# Patient Record
Sex: FEMALE | Race: WHITE | HISPANIC OR LATINO
[De-identification: names, ages, dates, MRNs, and addresses within clinical notes are randomized per-mention and may not be internally consistent; named-entity substitution may affect disease eponyms.]

---

## 2019-08-13 PROBLEM — Z00.00 ENCOUNTER FOR PREVENTIVE HEALTH EXAMINATION: Status: ACTIVE | Noted: 2019-08-13

## 2019-08-28 ENCOUNTER — APPOINTMENT (OUTPATIENT)
Dept: PULMONOLOGY | Facility: CLINIC | Age: 44
End: 2019-08-28
Payer: COMMERCIAL

## 2019-08-28 VITALS
BODY MASS INDEX: 51.91 KG/M2 | DIASTOLIC BLOOD PRESSURE: 78 MMHG | SYSTOLIC BLOOD PRESSURE: 122 MMHG | RESPIRATION RATE: 16 BRPM | WEIGHT: 293 LBS | HEIGHT: 63 IN | OXYGEN SATURATION: 98 % | HEART RATE: 71 BPM | TEMPERATURE: 98 F

## 2019-08-28 DIAGNOSIS — E66.01 MORBID (SEVERE) OBESITY DUE TO EXCESS CALORIES: ICD-10-CM

## 2019-08-28 DIAGNOSIS — Z87.442 PERSONAL HISTORY OF URINARY CALCULI: ICD-10-CM

## 2019-08-28 DIAGNOSIS — E78.00 PURE HYPERCHOLESTEROLEMIA, UNSPECIFIED: ICD-10-CM

## 2019-08-28 DIAGNOSIS — I10 ESSENTIAL (PRIMARY) HYPERTENSION: ICD-10-CM

## 2019-08-28 DIAGNOSIS — Z78.9 OTHER SPECIFIED HEALTH STATUS: ICD-10-CM

## 2019-08-28 DIAGNOSIS — R06.81 APNEA, NOT ELSEWHERE CLASSIFIED: ICD-10-CM

## 2019-08-28 DIAGNOSIS — R06.83 SNORING: ICD-10-CM

## 2019-08-28 PROCEDURE — 99204 OFFICE O/P NEW MOD 45 MIN: CPT | Mod: 25

## 2019-08-28 NOTE — PHYSICAL EXAM
[General Appearance - Well Developed] : well developed [Normal Appearance] : normal appearance [General Appearance - Well Nourished] : well nourished [Elongated Uvula] : elongated uvula [Low Lying Soft Palate] : low lying soft palate [Neck Appearance] : the appearance of the neck was normal [III] : III [Neck Circumference: ___] : neck circumference is [unfilled] [Apical Impulse] : the apical impulse was normal [Heart Rate And Rhythm] : heart rate was normal and rhythm regular [Heart Sounds] : normal S1 and S2 [Respiration, Rhythm And Depth] : normal respiratory rhythm and effort [] : no respiratory distress [Auscultation Breath Sounds / Voice Sounds] : lungs were clear to auscultation bilaterally [Musculoskeletal - Swelling] : no joint swelling seen [Nail Clubbing] : no clubbing of the fingernails [Cyanosis, Localized] : no localized cyanosis [Skin Color & Pigmentation] : normal skin color and pigmentation [Skin Lesions] : no skin lesions [Oriented To Time, Place, And Person] : oriented to person, place, and time [No Focal Deficits] : no focal deficits [Impaired Insight] : insight and judgment were intact [Affect] : the affect was normal [FreeTextEntry1] : Obese [FreeTextEntry2] : no edema

## 2019-08-28 NOTE — REASON FOR VISIT
[Consultation] : a consultation visit [Sleep Apnea] : sleep apnea [FreeTextEntry1] : Referred by Dr. Marshall- [facility]

## 2019-08-28 NOTE — ASSESSMENT
[Obesity, Class III, BMI 40-49.9 (E66.01)] : macrophage activation syndrome [FreeTextEntry1] : This is a 43 year old female referred by [facility] pulmonologists for evaluation of possible sleep apnea. The patient has multiple signs and symptoms of sleep-disordered breathing include frequent nocturnal awakenings, loud snoring, witnessed apneas, daytime sleepiness, and early morning dry mouth. She has other cofactors such as hypertension and obesity which can results in RAY or be a sequela. She was referred to the [facility] for a diagnostic PSG. The ramifications of RAY and its potential therapeutic modalities were discussed with the patient. The patient was cautioned on the importance of avoiding drowsy driving. She will follow up with us after the PSG.\par \par In addition there is concern for nocturnal hypoventilation given her weight. Therefore the in lab polysomnograph will include transcutaneous CO2 monitoring to evaluate for hypoventilation. I will also perform pulmonary function tests to evaluate her lungs.

## 2019-08-28 NOTE — CONSULT LETTER
[Consult Letter:] : I had the pleasure of evaluating your patient, [unfilled]. [Dear  ___] : Dear  [unfilled], [Consult Closing:] : Thank you very much for allowing me to participate in the care of this patient.  If you have any questions, please do not hesitate to contact me. [Please see my note below.] : Please see my note below. [Sincerely,] : Sincerely, [FreeTextEntry3] : Kamran Stroud DO, MPH\par Pulmonary, Critical Care, and Sleep Medicine\par Pulmonary Medicine [facility] at [facility]

## 2019-08-28 NOTE — REVIEW OF SYSTEMS
[EDS: ESS=____] : daytime somnolence: ESS=[unfilled] [Recent Wt Gain (___ Lbs)] : recent [unfilled] ~Ulb weight gain [Snoring] : snoring [Nasal Congestion] : nasal congestion [Witnessed Apneas] : witnessed apnea [A.M. Dry Mouth] : a.m. dry mouth [Obesity] : obesity [Difficulty Maintaining Sleep] : difficulty maintaining sleep [Negative] : Psychiatric [Leg Dysesthesias] : no leg dysesthesias [Difficulty Initiating Sleep] : no difficulty falling asleep [Acute Insomnia] : no acute insomnia [Chronic Insomnia] : no chronic  insomnia [Lower Extremity Discomfort] : no lower extremity discomfort [Irresistible urge to move legs] : no irresistible urge to move legs because of lower extremity discomfort [LE discomfort relieved by movement] : lower extremity discomfort not relieved by movement [Sleep Disturbances due to LE symptoms] : ~T no sleep disturbances due to lower extremity symptoms [Late day/ Evening symptoms] : no late day/evening symptoms [Unusual Sleep Behavior] : no unusual sleep behavior [Hypersomnolence] : not sleeping much more than usual [Cataplexy] :  no cataplexy [Hypnogogic Hallucinations] : no hypnogogic hallucinations [Hypnopompic Hallucinations] : no hypnopompic hallucinations [Sleep Paralysis] : no sleep paralysis

## 2019-08-28 NOTE — HISTORY OF PRESENT ILLNESS
[Snoring] : snoring [Witnessed Apneas] : witnessed sleep apnea [Frequent Nocturnal Awakening] : frequent nocturnal awakening [Daytime Somnolence] : daytime somnolence [ESS: ___] : ESS score [unfilled] [Unintentional Sleep While Inactive] : unintentional sleep while inactive [Awakes Unrefreshed] : awakening unrefreshed [Awakening With Dry Mouth] : awakening with dry mouth [Recent  Weight Gain] : recent weight gain [DMS] : DMS [FreeTextEntry1] : Ms. Castillo is a 43-year-old female with a significant past medical history of obesity and high blood pressure who comes in for evaluation sleep disordered breathing. Per the patient she was referred by the pulmonologists at [facility]. She was told that she has some desaturation at night when she was hospitalized recently for some chest pain. Currently she complains of frequent nocturnal awakenings which results in gasping for air. Her daughters, who are present, also state that they have witnessed this. She feels tired throughout most of the day. She does admit to some shortness of breath however it is not constant. She is originally from [place]. She knows she is overweight, and has been trying to lose weight by eating well. The rest of her symptoms include frequent nocturnal awakenings, loud snoring, witnessed apneas, daytime sleepiness, and early morning dry mouth.\par \par \par Of note she has been referred for sleep study twice in the past but has been too afraid to undergo the testing. [Unintentional Sleep while Active] : no unintentional sleep while active [Awakes with Headache] : no headache upon awakening [DIS] : no DIS [Hypersomnolence] : no hypersomnolence [Unusual Sleep Behavior] : no unusual sleep behavior [Cataplexy] : no cataplexy [Sleep Paralysis] : no sleep paralysis [Hypnagogic Hallucinations] : no hallucinations when falling asleep [Hypnopompic Hallucinations] : no hallucinations when awakening

## 2019-09-09 ENCOUNTER — APPOINTMENT (OUTPATIENT)
Dept: PULMONOLOGY | Facility: CLINIC | Age: 44
End: 2019-09-09

## 2020-03-28 ENCOUNTER — EMERGENCY (EMERGENCY)
Facility: HOSPITAL | Age: 45
LOS: 1 days | Discharge: HOME | End: 2020-03-28
Attending: EMERGENCY MEDICINE | Admitting: EMERGENCY MEDICINE
Payer: SELF-PAY

## 2020-03-28 VITALS
OXYGEN SATURATION: 100 % | HEIGHT: 61 IN | SYSTOLIC BLOOD PRESSURE: 138 MMHG | TEMPERATURE: 98 F | RESPIRATION RATE: 126 BRPM | WEIGHT: 281.09 LBS | DIASTOLIC BLOOD PRESSURE: 93 MMHG | HEART RATE: 77 BPM

## 2020-03-28 VITALS
OXYGEN SATURATION: 99 % | SYSTOLIC BLOOD PRESSURE: 112 MMHG | DIASTOLIC BLOOD PRESSURE: 85 MMHG | TEMPERATURE: 98 F | HEART RATE: 71 BPM | RESPIRATION RATE: 17 BRPM

## 2020-03-28 LAB
ALBUMIN SERPL ELPH-MCNC: 3.9 G/DL — SIGNIFICANT CHANGE UP (ref 3.3–5)
ALP SERPL-CCNC: 58 U/L — SIGNIFICANT CHANGE UP (ref 30–120)
ALT FLD-CCNC: 27 U/L DA — SIGNIFICANT CHANGE UP (ref 10–60)
ANION GAP SERPL CALC-SCNC: 7 MMOL/L — SIGNIFICANT CHANGE UP (ref 5–17)
APPEARANCE UR: CLEAR — SIGNIFICANT CHANGE UP
AST SERPL-CCNC: 30 U/L — SIGNIFICANT CHANGE UP (ref 10–40)
BASOPHILS # BLD AUTO: 0.02 K/UL — SIGNIFICANT CHANGE UP (ref 0–0.2)
BASOPHILS NFR BLD AUTO: 0.5 % — SIGNIFICANT CHANGE UP (ref 0–2)
BILIRUB SERPL-MCNC: 0.2 MG/DL — SIGNIFICANT CHANGE UP (ref 0.2–1.2)
BILIRUB UR-MCNC: NEGATIVE — SIGNIFICANT CHANGE UP
BUN SERPL-MCNC: 8 MG/DL — SIGNIFICANT CHANGE UP (ref 7–23)
CALCIUM SERPL-MCNC: 8.8 MG/DL — SIGNIFICANT CHANGE UP (ref 8.4–10.5)
CHLORIDE SERPL-SCNC: 105 MMOL/L — SIGNIFICANT CHANGE UP (ref 96–108)
CO2 SERPL-SCNC: 27 MMOL/L — SIGNIFICANT CHANGE UP (ref 22–31)
COLOR SPEC: SIGNIFICANT CHANGE UP
CREAT SERPL-MCNC: 0.67 MG/DL — SIGNIFICANT CHANGE UP (ref 0.5–1.3)
DIFF PNL FLD: NEGATIVE — SIGNIFICANT CHANGE UP
EOSINOPHIL # BLD AUTO: 0.01 K/UL — SIGNIFICANT CHANGE UP (ref 0–0.5)
EOSINOPHIL NFR BLD AUTO: 0.3 % — SIGNIFICANT CHANGE UP (ref 0–6)
GLUCOSE SERPL-MCNC: 100 MG/DL — HIGH (ref 70–99)
GLUCOSE UR QL: NEGATIVE MG/DL — SIGNIFICANT CHANGE UP
HCG UR QL: NEGATIVE — SIGNIFICANT CHANGE UP
HCT VFR BLD CALC: 45.1 % — HIGH (ref 34.5–45)
HGB BLD-MCNC: 14.5 G/DL — SIGNIFICANT CHANGE UP (ref 11.5–15.5)
IMM GRANULOCYTES NFR BLD AUTO: 0.5 % — SIGNIFICANT CHANGE UP (ref 0–1.5)
KETONES UR-MCNC: NEGATIVE — SIGNIFICANT CHANGE UP
LEUKOCYTE ESTERASE UR-ACNC: NEGATIVE — SIGNIFICANT CHANGE UP
LYMPHOCYTES # BLD AUTO: 1.45 K/UL — SIGNIFICANT CHANGE UP (ref 1–3.3)
LYMPHOCYTES # BLD AUTO: 36.3 % — SIGNIFICANT CHANGE UP (ref 13–44)
MCHC RBC-ENTMCNC: 28.3 PG — SIGNIFICANT CHANGE UP (ref 27–34)
MCHC RBC-ENTMCNC: 32.2 GM/DL — SIGNIFICANT CHANGE UP (ref 32–36)
MCV RBC AUTO: 87.9 FL — SIGNIFICANT CHANGE UP (ref 80–100)
MONOCYTES # BLD AUTO: 0.46 K/UL — SIGNIFICANT CHANGE UP (ref 0–0.9)
MONOCYTES NFR BLD AUTO: 11.5 % — SIGNIFICANT CHANGE UP (ref 2–14)
NEUTROPHILS # BLD AUTO: 2.03 K/UL — SIGNIFICANT CHANGE UP (ref 1.8–7.4)
NEUTROPHILS NFR BLD AUTO: 50.9 % — SIGNIFICANT CHANGE UP (ref 43–77)
NITRITE UR-MCNC: NEGATIVE — SIGNIFICANT CHANGE UP
NRBC # BLD: 0 /100 WBCS — SIGNIFICANT CHANGE UP (ref 0–0)
PH UR: 7 — SIGNIFICANT CHANGE UP (ref 5–8)
PLATELET # BLD AUTO: 220 K/UL — SIGNIFICANT CHANGE UP (ref 150–400)
POTASSIUM SERPL-MCNC: 4.6 MMOL/L — SIGNIFICANT CHANGE UP (ref 3.5–5.3)
POTASSIUM SERPL-SCNC: 4.6 MMOL/L — SIGNIFICANT CHANGE UP (ref 3.5–5.3)
PROT SERPL-MCNC: 8.2 G/DL — SIGNIFICANT CHANGE UP (ref 6–8.3)
PROT UR-MCNC: NEGATIVE MG/DL — SIGNIFICANT CHANGE UP
RBC # BLD: 5.13 M/UL — SIGNIFICANT CHANGE UP (ref 3.8–5.2)
RBC # FLD: 13.4 % — SIGNIFICANT CHANGE UP (ref 10.3–14.5)
SODIUM SERPL-SCNC: 139 MMOL/L — SIGNIFICANT CHANGE UP (ref 135–145)
SP GR SPEC: 1 — LOW (ref 1.01–1.02)
UROBILINOGEN FLD QL: NEGATIVE MG/DL — SIGNIFICANT CHANGE UP
WBC # BLD: 3.99 K/UL — SIGNIFICANT CHANGE UP (ref 3.8–10.5)
WBC # FLD AUTO: 3.99 K/UL — SIGNIFICANT CHANGE UP (ref 3.8–10.5)

## 2020-03-28 PROCEDURE — 74176 CT ABD & PELVIS W/O CONTRAST: CPT | Mod: 26

## 2020-03-28 PROCEDURE — 85027 COMPLETE CBC AUTOMATED: CPT

## 2020-03-28 PROCEDURE — 80053 COMPREHEN METABOLIC PANEL: CPT

## 2020-03-28 PROCEDURE — 36415 COLL VENOUS BLD VENIPUNCTURE: CPT

## 2020-03-28 PROCEDURE — 81025 URINE PREGNANCY TEST: CPT

## 2020-03-28 PROCEDURE — 87086 URINE CULTURE/COLONY COUNT: CPT

## 2020-03-28 PROCEDURE — 99284 EMERGENCY DEPT VISIT MOD MDM: CPT

## 2020-03-28 PROCEDURE — 74176 CT ABD & PELVIS W/O CONTRAST: CPT

## 2020-03-28 PROCEDURE — 96374 THER/PROPH/DIAG INJ IV PUSH: CPT

## 2020-03-28 PROCEDURE — 81003 URINALYSIS AUTO W/O SCOPE: CPT

## 2020-03-28 PROCEDURE — 99284 EMERGENCY DEPT VISIT MOD MDM: CPT | Mod: 25

## 2020-03-28 RX ORDER — SODIUM CHLORIDE 9 MG/ML
1000 INJECTION INTRAMUSCULAR; INTRAVENOUS; SUBCUTANEOUS ONCE
Refills: 0 | Status: COMPLETED | OUTPATIENT
Start: 2020-03-28 | End: 2020-03-28

## 2020-03-28 RX ORDER — ACETAMINOPHEN 500 MG
1000 TABLET ORAL ONCE
Refills: 0 | Status: COMPLETED | OUTPATIENT
Start: 2020-03-28 | End: 2020-03-28

## 2020-03-28 RX ADMIN — Medication 1000 MILLIGRAM(S): at 14:45

## 2020-03-28 RX ADMIN — SODIUM CHLORIDE 1000 MILLILITER(S): 9 INJECTION INTRAMUSCULAR; INTRAVENOUS; SUBCUTANEOUS at 15:17

## 2020-03-28 RX ADMIN — SODIUM CHLORIDE 1000 MILLILITER(S): 9 INJECTION INTRAMUSCULAR; INTRAVENOUS; SUBCUTANEOUS at 14:31

## 2020-03-28 RX ADMIN — Medication 400 MILLIGRAM(S): at 14:31

## 2020-03-28 NOTE — ED ADULT TRIAGE NOTE - NS ED NURSE DIRECT TO ROOM YN
Sparkfly Access Code: [email or web address]  Expires: 4/16/2017  6:03 PM    Your email address is not on file at [facility].  Email Addresses are required for you to sign up for Sparkfly, please contact [phone] to verify your personal information and to provide your email address prior to attempting to register for Sparkfly.    [place] [address]    Sparkfly  A secure, online tool to manage your health information     [facility]’s Sparkfly® is a secure, online tool that connects you to your personalized health information from the privacy of your home -- day or night - making it very easy for you to manage your healthcare. Once the activation process is completed, you can even access your medical information using the Sparkfly joon, which is available for free in the Apple Joon store or Google Play store.     To learn more about Sparkfly, visit [email or web address]    There are two levels of access available (as shown below):   My Chart Features  [facility] Primary Care Doctor [facility]  Specialists [facility]  Urgent  Care Non-[facility] Primary Care Doctor   Email your healthcare team securely and privately 24/7 X X X    Manage appointments: schedule your next appointment; view details of past/upcoming appointments X      Request prescription refills. X      View recent personal medical records, including lab and immunizations X X X X   View health record, including health history, allergies, medications X X X X   Read reports about your outpatient visits, procedures, consult and ER notes X X X X   See your discharge summary, which is a recap of your hospital and/or ER visit that includes your diagnosis, lab results, and care plan X X  X     How to register for [email or web address]:  Once your e-mail address has been verified, follow the following steps to sign up for Sparkfly.     1. Go to  [email or web address]  2. Click on the Sign Up Now box, which takes you to the New Member Sign Up page. You  will need to provide the following information:  a. Enter your Buzzient Access Code exactly as it appears at the top of this page. (You will not need to use this code after you’ve completed the sign-up process. If you do not sign up before the expiration date, you must request a new code.)   b. Enter your date of birth.   c. Enter your home email address.   d. Click Submit, and follow the next screen’s instructions.  3. Create a Branch[employer] ID. This will be your Buzzient login ID and cannot be changed, so think of one that is secure and easy to remember.  4. Create a Buzzient password. You can change your password at any time.  5. Enter your Password Reset Question and Answer. This can be used at a later time if you forget your password.   6. Enter your e-mail address. This allows you to receive e-mail notifications when new information is available in Buzzient.  7. Click Sign Up. You can now view your health information.    For assistance activating your Buzzient account, call [phone]          Yes

## 2020-03-28 NOTE — ED ADULT TRIAGE NOTE - CHIEF COMPLAINT QUOTE
"I had kidney stones in February and in the past as well and I have the same pain again to my left back for 2 weeks with fever." "I had kidney stones in February and in the past as well and I have the same pain again to my left back for 2 weeks with fever." Patient was treated at [facility] and states she had CT showing kidney stones in February.

## 2020-03-28 NOTE — ED PROVIDER NOTE - PATIENT PORTAL LINK FT
You can access the FollowMyHealth Patient Portal offered by [facility] by registering at the following website: [email or web address]. By joining [employer]’s FollowMyHealth portal, you will also be able to view your health information using other applications (apps) compatible with our system.

## 2020-03-28 NOTE — ED ADULT NURSE NOTE - CHIEF COMPLAINT QUOTE
"I had kidney stones in February and in the past as well and I have the same pain again to my left back for 2 weeks with fever." Patient was treated at [facility] and states she had CT showing kidney stones in February.

## 2020-03-28 NOTE — ED PROVIDER NOTE - OBJECTIVE STATEMENT
45 y/o female with a hx of Kidney stones 2 months ago, presents to the ED c/o L flank pain x 2 weeks. Also c/o fever and slight cough. Denies SOB, N/V, or other sx. Took Tylenol at home with some relief. Denies sick contacts or travel.

## 2020-03-28 NOTE — ED ADULT NURSE NOTE - OBJECTIVE STATEMENT
pt comes to ed c/o let flank pain, hx fo kidney stones in the past and feels the same. pt denies cough, cp, recent travel or sick contacts.

## 2020-03-28 NOTE — ED PROVIDER NOTE - NSFOLLOWUPINSTRUCTIONS_ED_ALL_ED_FT
Flank Pain, Adult  Flank pain is pain in your side. The flank is the area of your side between your upper belly (abdomen) and your back. The pain may occur over a short time (acute), or it may be long-term or come back often (chronic). It may be mild or very bad. Pain in this area can be caused by many different things.  Follow these instructions at home:     Drink enough fluid to keep your pee (urine) clear or pale yellow.Rest as told by your doctor.Take over-the-counter and prescription medicines only as told by your doctor.Keep a journal to keep track of:  What has caused your flank pain.What has made it feel better.Keep all follow-up visits as told by your doctor. This is important.Contact a doctor if:  Medicine does not help your pain.You have new symptoms.Your pain gets worse.You have a fever.Your symptoms last longer than 2–3 days.You have trouble peeing.You are peeing more often than normal.Get help right away if:  You have trouble breathing.You are short of breath.Your belly hurts, or it is swollen or red.You feel sick to your stomach (nauseous).You throw up (vomit).You feel like you will pass out, or you do pass out (faint).You have blood in your pee.Summary  Flank pain is pain in your side. The flank is the area of your side between your upper belly (abdomen) and your back.Flank pain may occur over a short time (acute), or it may be long-term or come back often (chronic). It may be mild or very bad.Pain in this area can be caused by many different things.Contact your doctor if your symptoms get worse or they last longer than 2–3 days.This information is not intended to replace advice given to you by your health care provider. Make sure you discuss any questions you have with your health care provider.

## 2020-03-29 LAB
CULTURE RESULTS: SIGNIFICANT CHANGE UP
SPECIMEN SOURCE: SIGNIFICANT CHANGE UP

## 2020-06-03 ENCOUNTER — EMERGENCY (EMERGENCY)
Facility: HOSPITAL | Age: 45
LOS: 1 days | Discharge: HOME | End: 2020-06-03
Attending: EMERGENCY MEDICINE | Admitting: EMERGENCY MEDICINE
Payer: MEDICAID

## 2020-06-03 VITALS
HEIGHT: 63 IN | SYSTOLIC BLOOD PRESSURE: 138 MMHG | HEART RATE: 82 BPM | OXYGEN SATURATION: 98 % | DIASTOLIC BLOOD PRESSURE: 76 MMHG | RESPIRATION RATE: 18 BRPM | WEIGHT: 278 LBS | TEMPERATURE: 99 F

## 2020-06-03 PROCEDURE — 99283 EMERGENCY DEPT VISIT LOW MDM: CPT

## 2020-06-03 RX ORDER — IBUPROFEN 200 MG
800 TABLET ORAL ONCE
Refills: 0 | Status: COMPLETED | OUTPATIENT
Start: 2020-06-03 | End: 2020-06-03

## 2020-06-03 NOTE — ED PROVIDER NOTE - CLINICAL SUMMARY MEDICAL DECISION MAKING FREE TEXT BOX
acute throat pain, also left maxillary sinus ttp - r/o strep - possibly acute sinusitis - will treat as bacterial, symptoms 4d and worsening with subjective fever

## 2020-06-03 NOTE — ED PROVIDER NOTE - PATIENT PORTAL LINK FT
You can access the FollowMyHealth Patient Portal offered by [facility] by registering at the following website: [email or web address]. By joining [employer]’s FollowMyHealth portal, you will also be able to view your health information using other applications (apps) compatible with our system.

## 2020-06-03 NOTE — ED PROVIDER NOTE - ENMT, MLM
Airway patent, Nasal mucosa clear. Mouth with normal mucosa. tonsils mildly enlarged, no exudates, slight erythema, uvula midline; +left maxillary sinus ttp

## 2020-06-03 NOTE — ED PROVIDER NOTE - OBJECTIVE STATEMENT
44 y.o. F c/o ST x 4d, thought was allergies, was at a park, has been isolating since covid, no known contacts, feels her tonsils are big, is eating/drinking well, notes fever 101 a couple of days ago, some pressure left maxillary area as well, no cough, no sob

## 2020-06-04 VITALS
SYSTOLIC BLOOD PRESSURE: 107 MMHG | HEART RATE: 63 BPM | TEMPERATURE: 98 F | OXYGEN SATURATION: 100 % | RESPIRATION RATE: 16 BRPM | DIASTOLIC BLOOD PRESSURE: 75 MMHG

## 2020-06-04 DIAGNOSIS — Z98.51 TUBAL LIGATION STATUS: Chronic | ICD-10-CM

## 2020-06-04 PROBLEM — N20.0 CALCULUS OF KIDNEY: Chronic | Status: ACTIVE | Noted: 2020-03-28

## 2020-06-04 LAB — S PYO AG SPEC QL IA: NEGATIVE — SIGNIFICANT CHANGE UP

## 2020-06-04 PROCEDURE — 99283 EMERGENCY DEPT VISIT LOW MDM: CPT

## 2020-06-04 PROCEDURE — 87880 STREP A ASSAY W/OPTIC: CPT

## 2020-06-04 RX ORDER — AZITHROMYCIN 500 MG/1
500 TABLET, FILM COATED ORAL ONCE
Refills: 0 | Status: COMPLETED | OUTPATIENT
Start: 2020-06-04 | End: 2020-06-04

## 2020-06-04 RX ORDER — AZITHROMYCIN 500 MG/1
1 TABLET, FILM COATED ORAL
Qty: 4 | Refills: 0
Start: 2020-06-04 | End: 2020-06-07

## 2020-06-04 RX ADMIN — AZITHROMYCIN 500 MILLIGRAM(S): 500 TABLET, FILM COATED ORAL at 01:10

## 2020-06-04 RX ADMIN — Medication 800 MILLIGRAM(S): at 00:15

## 2020-06-04 NOTE — ED ADULT NURSE NOTE - NSIMPLEMENTINTERV_GEN_ALL_ED
Implemented All Universal Safety Interventions:  [place] to call system. Call bell, personal items and telephone within reach. Instruct patient to call for assistance. Room bathroom lighting operational. Non-slip footwear when patient is off stretcher. Physically safe environment: no spills, clutter or unnecessary equipment. Stretcher in lowest position, wheels locked, appropriate side rails in place.

## 2020-06-04 NOTE — ED ADULT NURSE NOTE - OBJECTIVE STATEMENT
44 yr old female walked into ED c/o throat pain, fever and stuffy nose since Sunday; pt states she went for a walk on Sunday and woke up with fever and chills on Monday morning; states she suffers from sinusitis

## 2021-03-10 ENCOUNTER — RESULT REVIEW (OUTPATIENT)
Age: 46
End: 2021-03-10

## 2021-06-13 ENCOUNTER — INPATIENT (INPATIENT)
Facility: HOSPITAL | Age: 46
LOS: 1 days | Discharge: HOME | DRG: 313 | End: 2021-06-15
Attending: HOSPITALIST | Admitting: HOSPITALIST
Payer: COMMERCIAL

## 2021-06-13 VITALS
DIASTOLIC BLOOD PRESSURE: 64 MMHG | SYSTOLIC BLOOD PRESSURE: 128 MMHG | HEIGHT: 63 IN | WEIGHT: 293 LBS | RESPIRATION RATE: 20 BRPM | TEMPERATURE: 98 F | HEART RATE: 70 BPM | OXYGEN SATURATION: 98 %

## 2021-06-13 DIAGNOSIS — Z98.891 HISTORY OF UTERINE SCAR FROM PREVIOUS SURGERY: Chronic | ICD-10-CM

## 2021-06-13 DIAGNOSIS — R09.89 OTHER SPECIFIED SYMPTOMS AND SIGNS INVOLVING THE CIRCULATORY AND RESPIRATORY SYSTEMS: ICD-10-CM

## 2021-06-13 DIAGNOSIS — Z90.49 ACQUIRED ABSENCE OF OTHER SPECIFIED PARTS OF DIGESTIVE TRACT: Chronic | ICD-10-CM

## 2021-06-13 DIAGNOSIS — R07.9 CHEST PAIN, UNSPECIFIED: ICD-10-CM

## 2021-06-13 DIAGNOSIS — Z98.51 TUBAL LIGATION STATUS: Chronic | ICD-10-CM

## 2021-06-13 LAB
ALBUMIN SERPL ELPH-MCNC: 3.5 G/DL — SIGNIFICANT CHANGE UP (ref 3.3–5)
ALP SERPL-CCNC: 65 U/L — SIGNIFICANT CHANGE UP (ref 30–120)
ALT FLD-CCNC: 25 U/L DA — SIGNIFICANT CHANGE UP (ref 10–60)
ANION GAP SERPL CALC-SCNC: 7 MMOL/L — SIGNIFICANT CHANGE UP (ref 5–17)
AST SERPL-CCNC: 19 U/L — SIGNIFICANT CHANGE UP (ref 10–40)
BASOPHILS # BLD AUTO: 0.07 K/UL — SIGNIFICANT CHANGE UP (ref 0–0.2)
BASOPHILS NFR BLD AUTO: 1 % — SIGNIFICANT CHANGE UP (ref 0–2)
BILIRUB SERPL-MCNC: 0.3 MG/DL — SIGNIFICANT CHANGE UP (ref 0.2–1.2)
BUN SERPL-MCNC: 12 MG/DL — SIGNIFICANT CHANGE UP (ref 7–23)
CALCIUM SERPL-MCNC: 8.5 MG/DL — SIGNIFICANT CHANGE UP (ref 8.4–10.5)
CHLORIDE SERPL-SCNC: 103 MMOL/L — SIGNIFICANT CHANGE UP (ref 96–108)
CO2 SERPL-SCNC: 26 MMOL/L — SIGNIFICANT CHANGE UP (ref 22–31)
CREAT SERPL-MCNC: 0.74 MG/DL — SIGNIFICANT CHANGE UP (ref 0.5–1.3)
D DIMER BLD IA.RAPID-MCNC: 471 NG/ML DDU — HIGH
EOSINOPHIL # BLD AUTO: 0.24 K/UL — SIGNIFICANT CHANGE UP (ref 0–0.5)
EOSINOPHIL NFR BLD AUTO: 3.4 % — SIGNIFICANT CHANGE UP (ref 0–6)
GLUCOSE SERPL-MCNC: 119 MG/DL — HIGH (ref 70–99)
HCG SERPL-ACNC: 1 MIU/ML — SIGNIFICANT CHANGE UP
HCG UR QL: NEGATIVE — SIGNIFICANT CHANGE UP
HCT VFR BLD CALC: 36.3 % — SIGNIFICANT CHANGE UP (ref 34.5–45)
HGB BLD-MCNC: 11.8 G/DL — SIGNIFICANT CHANGE UP (ref 11.5–15.5)
IMM GRANULOCYTES NFR BLD AUTO: 0.3 % — SIGNIFICANT CHANGE UP (ref 0–1.5)
LIDOCAIN IGE QN: 104 U/L — SIGNIFICANT CHANGE UP (ref 73–393)
LYMPHOCYTES # BLD AUTO: 2.34 K/UL — SIGNIFICANT CHANGE UP (ref 1–3.3)
LYMPHOCYTES # BLD AUTO: 33.3 % — SIGNIFICANT CHANGE UP (ref 13–44)
MCHC RBC-ENTMCNC: 28.2 PG — SIGNIFICANT CHANGE UP (ref 27–34)
MCHC RBC-ENTMCNC: 32.5 GM/DL — SIGNIFICANT CHANGE UP (ref 32–36)
MCV RBC AUTO: 86.6 FL — SIGNIFICANT CHANGE UP (ref 80–100)
MONOCYTES # BLD AUTO: 0.54 K/UL — SIGNIFICANT CHANGE UP (ref 0–0.9)
MONOCYTES NFR BLD AUTO: 7.7 % — SIGNIFICANT CHANGE UP (ref 2–14)
NEUTROPHILS # BLD AUTO: 3.82 K/UL — SIGNIFICANT CHANGE UP (ref 1.8–7.4)
NEUTROPHILS NFR BLD AUTO: 54.3 % — SIGNIFICANT CHANGE UP (ref 43–77)
NRBC # BLD: 0 /100 WBCS — SIGNIFICANT CHANGE UP (ref 0–0)
PLATELET # BLD AUTO: 259 K/UL — SIGNIFICANT CHANGE UP (ref 150–400)
POTASSIUM SERPL-MCNC: 3.7 MMOL/L — SIGNIFICANT CHANGE UP (ref 3.5–5.3)
POTASSIUM SERPL-SCNC: 3.7 MMOL/L — SIGNIFICANT CHANGE UP (ref 3.5–5.3)
PROT SERPL-MCNC: 7.5 G/DL — SIGNIFICANT CHANGE UP (ref 6–8.3)
RBC # BLD: 4.19 M/UL — SIGNIFICANT CHANGE UP (ref 3.8–5.2)
RBC # FLD: 13.2 % — SIGNIFICANT CHANGE UP (ref 10.3–14.5)
SARS-COV-2 RNA SPEC QL NAA+PROBE: SIGNIFICANT CHANGE UP
SODIUM SERPL-SCNC: 136 MMOL/L — SIGNIFICANT CHANGE UP (ref 135–145)
TROPONIN I SERPL-MCNC: 0 NG/ML — LOW (ref 0.02–0.06)
TROPONIN I SERPL-MCNC: 0 NG/ML — LOW (ref 0.02–0.06)
WBC # BLD: 7.03 K/UL — SIGNIFICANT CHANGE UP (ref 3.8–10.5)
WBC # FLD AUTO: 7.03 K/UL — SIGNIFICANT CHANGE UP (ref 3.8–10.5)

## 2021-06-13 PROCEDURE — 99285 EMERGENCY DEPT VISIT HI MDM: CPT

## 2021-06-13 PROCEDURE — 71046 X-RAY EXAM CHEST 2 VIEWS: CPT | Mod: 26

## 2021-06-13 PROCEDURE — G1004: CPT

## 2021-06-13 PROCEDURE — 93010 ELECTROCARDIOGRAM REPORT: CPT

## 2021-06-13 PROCEDURE — 71275 CT ANGIOGRAPHY CHEST: CPT | Mod: 26,ME

## 2021-06-13 PROCEDURE — 99223 1ST HOSP IP/OBS HIGH 75: CPT

## 2021-06-13 RX ORDER — ENOXAPARIN SODIUM 100 MG/ML
140 INJECTION SUBCUTANEOUS EVERY 12 HOURS
Refills: 0 | Status: DISCONTINUED | OUTPATIENT
Start: 2021-06-14 | End: 2021-06-14

## 2021-06-13 RX ORDER — ENOXAPARIN SODIUM 100 MG/ML
140 INJECTION SUBCUTANEOUS ONCE
Refills: 0 | Status: COMPLETED | OUTPATIENT
Start: 2021-06-13 | End: 2021-06-13

## 2021-06-13 RX ADMIN — ENOXAPARIN SODIUM 140 MILLIGRAM(S): 100 INJECTION SUBCUTANEOUS at 20:25

## 2021-06-13 NOTE — ED PROVIDER NOTE - CARE PROVIDER_API CALL
Hal Marquez (MD)  Cardiovascular Disease; Internal Medicine  [address] [facility]  [place] [address]  Phone: [phone]  Fax: [phone]  Follow Up Time: 1-3 Days

## 2021-06-13 NOTE — ED PROVIDER NOTE - CHPI ED SYMPTOMS NEG
-abd pain, -LE pain/no cough/no fever/no nausea/no shortness of breath/no vomiting -abd pain, -LE pain/no cough/no dizziness/no fever/no nausea/no shortness of breath/no vomiting

## 2021-06-13 NOTE — CONSULT NOTE ADULT - ASSESSMENT
The patient is a 45 year old female with a history of HTN, HL, kidney stones who presents with chest pain and shortness of breath.    Plan:  - ECG with no evidence of ischemia or infarction  - Rule out an acute MI with two sets of cardiac enzymes  - D-dimer elevated at 471  - Check CTA chest to r/o PE  - If above work-up negative, patient can be discharged from a cardiac standpoint

## 2021-06-13 NOTE — CONSULT NOTE ADULT - SUBJECTIVE AND OBJECTIVE BOX
History of Present Illness: The patient is a 45 year old female with a history of HTN, HL, kidney stones who presents with chest pain and shortness of breath. She states yesterday around 2 pm she had left-sided chest discomfort described as a pressure with some pain in left shoulder. It was not worse with inspiration or to the touch. The pain went away. This morning she woke up with shortness of breath that she attributed to sinus issues. She used nasal spray with improvement. About 1 hour prior to arrival, the chest pain came back which prompted her to go to ED>    Past Medical/Surgical History:  HTN, HL, kidney stones     Medications:  Unknown    Family History: Non-contributory family history of premature cardiovascular atherosclerotic disease    Social History: No tobacco, alcohol or drug use    Review of Systems:  General: No fevers, chills, weight loss or gain  Skin: No rashes, color changes  Cardiovascular: (+) chest pain, orthopnea  Respiratory: No shortness of breath, cough  Gastrointestinal: No nausea, abdominal pain  Genitourinary: No incontinence, pain with urination  Musculoskeletal: No pain, swelling, decreased range of motion  Neurological: No headache, weakness  Psychiatric: No depression, anxiety  Endocrine: No weight loss or gain, increased thirst  All other systems are comprehensively negative.    Physical Exam:  Vitals:        Vital Signs Last 24 Hrs  T(C): 36.9 (13 Jun 2021 13:32), Max: 36.9 (13 Jun 2021 13:32)  T(F): 98.4 (13 Jun 2021 13:32), Max: 98.4 (13 Jun 2021 13:32)  HR: 92 (13 Jun 2021 13:58) (70 - 92)  BP: 128/64 (13 Jun 2021 13:32) (128/64 - 128/64)  BP(mean): --  RR: 20 (13 Jun 2021 13:58) (20 - 20)  SpO2: 98% (13 Jun 2021 13:58) (98% - 98%)  General: NAD  HEENT: MMM  Neck: No JVD, no carotid bruit  Lungs: CTAB  CV: RRR, nl S1/S2, no M/R/G  Abdomen: S/NT/ND, +BS  Extremities: No LE edema, no cyanosis  Neuro: AAOx3, non-focal  Skin: No rash    Labs:                        11.8   7.03  )-----------( 259      ( 13 Jun 2021 14:29 )             36.3     06-13    136  |  103  |  12  ----------------------------<  119<H>  3.7   |  26  |  0.74    Ca    8.5      13 Jun 2021 14:29    TPro  7.5  /  Alb  3.5  /  TBili  0.3  /  DBili  x   /  AST  19  /  ALT  25  /  AlkPhos  65  06-13    CARDIAC MARKERS ( 13 Jun 2021 14:29 )  .000 ng/mL / x     / x     / x     / x              ECG: NSR, normal axis, no ST abnormality

## 2021-06-13 NOTE — ED ADULT NURSE REASSESSMENT NOTE - NS ED NURSE REASSESS COMMENT FT1
pt ambulated independently to bathroom. pt appears to be in no acute distress. VSS. pt awaiting repeat troponin. safety maintained.

## 2021-06-13 NOTE — H&P ADULT - HISTORY OF PRESENT ILLNESS
45F with morbid obesity, psoriasis, varicose veins in her left leg, kidney stones, possibly HTN/HLD (not on meds) who presented for chest pain.  Symptoms started with some SOB in the morning.  Tried taking some nasal decongestion with no relief.  Also felt like she had something in her throat and had an episode of hemotpysis (none since).  Then later in the morning, she started to experience left sided chest pain.  Described as a pressure-like 5/10 waxing and waning non-radiating chest pain.  No known alleviated or aggravating factors.  Patient also reports no recent travels or long car/bus rides.  No recent surgeries.  Patient denies being on any oral contraceptives or hormonal replacement medications.  No known personal or family history of blood clots.  Patient also complained of some right sided headaches.  Also has been having mid to lower back pain for the past 3 days.  Daughter reports that the patient was having "kidney stone" pain the other day as well.  No nausea/vomiting.  No abdominal pain or diarrhea.  In the ED, patient had a negative troponin but an elevated d-dimer of 471.  However her CTA was non-diagnostic.  Patient is being admitted for a VQ scan to help rule out a PE.  Patient was started on therapeutic lovenox by the ED for presumptive PE still.  Currently the patient said her chest pain is nearly gone.

## 2021-06-13 NOTE — ED ADULT NURSE NOTE - NSIMPLEMENTINTERV_GEN_ALL_ED
Implemented All Universal Safety Interventions:  [place] to call system. Call bell, personal items and telephone within reach. Instruct patient to call for assistance. Room bathroom lighting operational. Non-slip footwear when patient is off stretcher. Physically safe environment: no spills, clutter or unnecessary equipment. Stretcher in lowest position, wheels locked, appropriate side rails in place.

## 2021-06-13 NOTE — H&P ADULT - NSHPLABSRESULTS_GEN_ALL_CORE
LABS:                        11.8   7.03  )-----------( 259      ( 13 Jun 2021 14:29 )             36.3     136    |  103    |  12     ----------------------------<  119<H>    13 Jun 2021 14:29  3.7     |  26     |  0.74     Ca 8.5           13 Jun 2021 14:29    TPro  7.5    /  Alb  3.5    /  TBili  0.3    /  DBili  x      /  AST  19     /  ALT  25     /  AlkPhos  65     13 Jun 2021 14:29    Troponin trend:  .004  06-13 @ 17:36  .000  06-13 @ 14:29    EKG:  NSR with no acute changes  Radiology:  < from: CT Angio Chest PE Protocol w/ IV Cont (06.13.21 @ 16:02) >    IMPRESSION: No pulmonary arterial emboli given the limitations of the study as described above.    < end of copied text > LABS:                        11.8   7.03  )-----------( 259      ( 13 Jun 2021 14:29 )             36.3     136    |  103    |  12     ----------------------------<  119<H>    13 Jun 2021 14:29  3.7     |  26     |  0.74     Ca 8.5           13 Jun 2021 14:29    TPro  7.5    /  Alb  3.5    /  TBili  0.3    /  DBili  x      /  AST  19     /  ALT  25     /  AlkPhos  65     13 Jun 2021 14:29    Troponin trend:  .004  06-13 @ 17:36  .000  06-13 @ 14:29    EKG:  NSR with no acute changes (interpreted and reviewed by me)  Radiology:  < from: CT Angio Chest PE Protocol w/ IV Cont (06.13.21 @ 16:02) >    IMPRESSION: No pulmonary arterial emboli given the limitations of the study as described above.    < end of copied text >

## 2021-06-13 NOTE — PATIENT PROFILE ADULT - NS PRO AD NO ADVANCE DIRECTIVE
PROCEDURE INFORMATION: 

Exam: CT Abdomen And Pelvis With Contrast 

Exam date and time: 5/3/2020 2:33 AM 

Age: 44 years old 

Clinical indication: Abdominal pain; Additional info: Luq abd pain 



TECHNIQUE: 

Imaging protocol: Computed tomography of the abdomen and pelvis with 

intravenous contrast. 

Radiation optimization: All CT scans at this facility use at least one of these 

dose optimization techniques: automated exposure control; mA and/or kV 

adjustment per patient size (includes targeted exams where dose is matched to 

clinical indication); or iterative reconstruction. 

Contrast material: ; Contrast volume: 100 ml; Contrast route: IV;  



COMPARISON: 

No relevant prior studies available. 



FINDINGS: 

Lungs: No suspicious mass or airspace process in the visualized lung bases. 



Liver: Liver appears normal with no focal abnormality. 

Gallbladder and bile ducts: Gallbladder is present and shows no evidence of 

gallstone. 

Pancreas: Pancreas appears normal. No focal mass or peripancreatic 

inflammation. 

Spleen: Spleen appears homogeneous without focal mass. 

Adrenals: Adrenal glands are normal in appearance. 

Kidneys and ureters: Kidneys are unremarkable aside from an upper pole 13 mm 

right renal lesion on image 49, indeterminate density. 

Stomach and bowel: No evidence of small bowel obstruction. No evidence of acute 

diverticulitis. 

Appendix: Normal caliber appendix is identified, with no adjacent inflammation. 



Intraperitoneal space: No mesenteric or omental mass. No pneumoperitoneum. 

Vasculature: No aortic aneurysm. Main portal and splenic veins enhance 

normally. 

Lymph nodes: No enlarged lymph nodes. 

Bladder: Urinary bladder appears normal. 

Reproductive: Prostate gland is normal in size. 



Bones/joints: Bony structures show no acute fracture or destructive process. 

Soft tissues: Unremarkable. 

Other findings: No concerning focal abnormality of the extrinsic soft tissues. 



IMPRESSION: 

1. No acute concerning abnormality to explain clinical symptoms. 

2. Indeterminate density 13 mm right renal upper pole lesion medially which 

could represent a complex cyst. This can be evaluated on outpatient basis with 

sonography 



Electronically signed by: Andrea Arteaga On 05/03/2020  02:49:49 AM
No

## 2021-06-13 NOTE — ED PROVIDER NOTE - CLINICAL SUMMARY MEDICAL DECISION MAKING FREE TEXT BOX
Pt with intermittent L sided chest pain since yesterday. Plan: EKG, CXR, labs, and cardiology consult Pt with intermittent L sided chest pain radiating to back since yesterday. Plan: EKG, CXR, labs, and cardiology consult

## 2021-06-13 NOTE — H&P ADULT - NSICDXPASTMEDICALHX_GEN_ALL_CORE_FT
PAST MEDICAL HISTORY:  H/O varicose veins     HLD (hyperlipidemia)     HTN (hypertension)     Kidney stones     Morbid obesity     Psoriasis

## 2021-06-13 NOTE — ED PROVIDER NOTE - OBJECTIVE STATEMENT
46 y/o F with PMHx of HTN, HLD, kidney stones, and s/p tubal ligation presents ambulatory to the ED c/o intermittent L sided +chest pain described as pressure since yesterday. No fever, cough, SOB, abd pain, nausea, vomiting, or LE pain. Did not received COVID vaccine. Allergic to penicillin. PCP: Dr. Dhaliwal. Cardiologist @ [facility]. 46 y/o F with PMHx of HTN, HLD, kidney stones, and s/p tubal ligation presents ambulatory to the ED c/o intermittent L sided +chest pain described as pressure since yesterday. No fever, cough, SOB, abd pain, nausea, vomiting, or LE pain. Did not received COVID vaccine. Allergic to penicillin. PCP: Dr. Keller. Cardiologist @ [facility].

## 2021-06-13 NOTE — ED ADULT NURSE NOTE - OBJECTIVE STATEMENT
45 YOF A&OX3 with pmh of HTN, kidney stones Surinamese and english speaking (prefers Urdu) presents to ED for left sided chest pain. pt states had chest discomfort that began yesterday that worsened while she was in [place], pt also expresses dizziness and difficulty catching her breath. pt describes discomfort as pressure. pt placed on cardiac monitor shows NSR in 70s. EKG completed in ED.  pt denies n/v/d, headaches, blurry vision. safety maintained.

## 2021-06-13 NOTE — H&P ADULT - ASSESSMENT
45F with morbid obesity, psoriasis, varicose veins in her left leg, kidney stones, possibly HTN/HLD (not on meds) who presented for chest pain.      Chest pain - her d-dimer was elevated but unfortunately her CTA was non-diagnostic.  Patient is therefore being admitted for a VQ scan and possibly TTE.  EKG normal  - admit to telemetry to monitor for arrhythmias  - repeat troponins  - already received lovenox 140mg subq empirically  - will continue lovenox for now   - monitor for bleeding  - spoke to cardiology (Dr. Marquez) regarding current plan -> VQ tomorrow  - pain control as needed    Elevated d-dimer - multiple possible causes such as PE.  DVT also needs to be ruled out as well since she is morbidly obese and has varicose veins in her left leg.  Elevated d-dimer can also be caused by her "inflammation" that her OB/GYN was concerned about and suggested surgery (malignancy?  patient does not know what the surgery is for).    - will be anti-coaguated with lovenox  - will check US dopplers for DVTs  - try to obtain collateral information from OB/GYN    Back pain/Kidney stones - had bilateral punctate nonobstructive renal calculi in 2020, Cr normal currently  - will obtain renal US to evaluate (cannot get CT 2/2 recent contrast dye load)  - order UA    Preventive measures  - will be therapeutically dosed with lovenox  45F with morbid obesity, psoriasis, varicose veins in her left leg, kidney stones, possibly HTN/HLD (not on meds) who presented for chest pain.      Chest pain - her d-dimer was elevated but unfortunately her CTA was non-diagnostic.  Patient is therefore being admitted for a VQ scan and possibly TTE.  EKG normal  - admit to telemetry to monitor for arrhythmias  - repeat troponins  - already received lovenox 140mg subq empirically  - will continue lovenox for now   - monitor for bleeding  - spoke to cardiology (Dr. Marquez) regarding current plan -> VQ tomorrow  - VQ ordered  - pain control as needed    Elevated d-dimer - multiple possible causes such as PE.  DVT also needs to be ruled out as well since she is morbidly obese and has varicose veins in her left leg.  Elevated d-dimer can also be caused by her "inflammation" that her OB/GYN was concerned about and suggested surgery (malignancy?  patient does not know what the surgery is for).    - will be anti-coaguated with lovenox  - will check US dopplers for DVTs  - try to obtain collateral information from OB/GYN    Back pain/Kidney stones - had bilateral punctate nonobstructive renal calculi in 2020, Cr normal currently  - will obtain renal US to evaluate (cannot get CT 2/2 recent contrast dye load)  - order UA    Preventive measures  - will be therapeutically dosed with lovenox

## 2021-06-13 NOTE — H&P ADULT - NSICDXFAMILYHX_GEN_ALL_CORE_FT
FAMILY HISTORY:  Mother  Still living? Unknown  FH: diabetes mellitus, Age at diagnosis: Age Unknown    Aunt  Still living? Unknown  FH: heart disease, Age at diagnosis: Age Unknown

## 2021-06-13 NOTE — ED PROVIDER NOTE - PROGRESS NOTE DETAILS
laureano (cards) seen eval pt, requests 2nd trop @1800 if neg, d/c to f/u as outpt CTA nondiagnostic, d/w laureano (cards) he requests admit vq tomorrow

## 2021-06-13 NOTE — H&P ADULT - NSHPPHYSICALEXAM_GEN_ALL_CORE
PHYSICAL EXAM:  Vital Signs Last 24 Hrs  T(C): 37.1 (13 Jun 2021 19:21), Max: 37.1 (13 Jun 2021 19:21)  T(F): 98.7 (13 Jun 2021 19:21), Max: 98.7 (13 Jun 2021 19:21)  HR: 67 (13 Jun 2021 19:21) (67 - 92)  BP: 140/76 (13 Jun 2021 19:21) (118/61 - 140/76)  BP(mean): --  RR: 16 (13 Jun 2021 19:21) (16 - 20)  SpO2: 98% (13 Jun 2021 19:21) (97% - 98%)    GENERAL:     morbidly obese Zambian-speaking only female in NAD  HEAD:     atraumatic  NECK:     supple  RESPIRATORY:     clear to auscultation bilaterally, no rales or rhonchi or wheezing or rubs  CARDIOVASCULAR:     regular rate and rhythm, no murmurs or rubs or gallops, 2+ peripheral pulses  GASTROINTESTINAL:     soft, nontender, nondistended, no hepatosplenomegaly palpated, bowel sounds present  EXTREMITIES:     trace non-pitting BLE edema, +left leg with varicose veins up to her thigh  MUSCULOSKELETAL:     no joint pain or swelling or deformities  NERVOUS SYSTEM:     motor strength intact with 5/5 B/L upper and lower extremities, no gross sensory deficits  SKIN:     no rashes or lesions  PSYCH:     appropriate, alert and orientated x3, good concentration

## 2021-06-13 NOTE — H&P ADULT - NSHPREVIEWOFSYSTEMS_GEN_ALL_CORE
REVIEW OF SYSTEMS:  CONSTITUTIONAL:    no fever or weight loss or fatigue  EYES:    no eye pain or visual disturbances or discharge  ENMT:     no difficulty hearing or tinnitus or vertigo, no sinus or throat pain  NECK:    no pain or stiffness  RESPIRATORY:    +hemoptysis, +SOB  CARDIOVASCULAR:    +chest pain  GASTROINTESTINAL:    no abdominal or epigastric pain. no nausea or vomiting or hematemesis, no diarrhea or constipation. no melena or hematochezia.  GENITOURINARY:    +on her period (has been passing clots, hx of "inflammation" that her ob/gyn wants to do surgery for)  NEUROLOGICAL:    no headaches or memory loss or loss of strength or numbness or tremors  SKIN:    no itching or burning or rashes or lesions   LYMPH NODES:    no enlarged glands  ENDOCRINE:    no heat or cold intolerance, no hair loss, no polydipsia or polyuria  MUSCULOSKELETAL:    +varicose veins  PSYCHIATRIC:    no depression or anxiety or mood swings or difficulty sleeping  HEME/LYMPH:    no easy bruising or bleeding gums  ALLERGY AND IMMUNOLOGIC:    +psoriasis

## 2021-06-14 LAB
ALBUMIN SERPL ELPH-MCNC: 3 G/DL — LOW (ref 3.3–5)
ALP SERPL-CCNC: 57 U/L — SIGNIFICANT CHANGE UP (ref 30–120)
ALT FLD-CCNC: 16 U/L DA — SIGNIFICANT CHANGE UP (ref 10–60)
ANION GAP SERPL CALC-SCNC: 8 MMOL/L — SIGNIFICANT CHANGE UP (ref 5–17)
APPEARANCE UR: CLEAR — SIGNIFICANT CHANGE UP
APTT BLD: 37.9 SEC — HIGH (ref 27.5–35.5)
AST SERPL-CCNC: 15 U/L — SIGNIFICANT CHANGE UP (ref 10–40)
BASOPHILS # BLD AUTO: 0.05 K/UL — SIGNIFICANT CHANGE UP (ref 0–0.2)
BASOPHILS NFR BLD AUTO: 0.7 % — SIGNIFICANT CHANGE UP (ref 0–2)
BILIRUB SERPL-MCNC: 0.3 MG/DL — SIGNIFICANT CHANGE UP (ref 0.2–1.2)
BILIRUB UR-MCNC: NEGATIVE — SIGNIFICANT CHANGE UP
BLD GP AB SCN SERPL QL: SIGNIFICANT CHANGE UP
BUN SERPL-MCNC: 10 MG/DL — SIGNIFICANT CHANGE UP (ref 7–23)
CALCIUM SERPL-MCNC: 8.3 MG/DL — LOW (ref 8.4–10.5)
CHLORIDE SERPL-SCNC: 105 MMOL/L — SIGNIFICANT CHANGE UP (ref 96–108)
CO2 SERPL-SCNC: 26 MMOL/L — SIGNIFICANT CHANGE UP (ref 22–31)
COLOR SPEC: YELLOW — SIGNIFICANT CHANGE UP
COVID-19 SPIKE DOMAIN AB INTERP: POSITIVE
COVID-19 SPIKE DOMAIN ANTIBODY RESULT: >250 U/ML — HIGH
CREAT SERPL-MCNC: 0.66 MG/DL — SIGNIFICANT CHANGE UP (ref 0.5–1.3)
DIFF PNL FLD: ABNORMAL
EOSINOPHIL # BLD AUTO: 0.31 K/UL — SIGNIFICANT CHANGE UP (ref 0–0.5)
EOSINOPHIL NFR BLD AUTO: 4.6 % — SIGNIFICANT CHANGE UP (ref 0–6)
GLUCOSE SERPL-MCNC: 109 MG/DL — HIGH (ref 70–99)
GLUCOSE UR QL: NEGATIVE MG/DL — SIGNIFICANT CHANGE UP
HCT VFR BLD CALC: 35 % — SIGNIFICANT CHANGE UP (ref 34.5–45)
HCT VFR BLD CALC: 38 % — SIGNIFICANT CHANGE UP (ref 34.5–45)
HGB BLD-MCNC: 11.4 G/DL — LOW (ref 11.5–15.5)
HGB BLD-MCNC: 12.1 G/DL — SIGNIFICANT CHANGE UP (ref 11.5–15.5)
IMM GRANULOCYTES NFR BLD AUTO: 0.3 % — SIGNIFICANT CHANGE UP (ref 0–1.5)
INR BLD: 1.1 RATIO — SIGNIFICANT CHANGE UP (ref 0.88–1.16)
KETONES UR-MCNC: NEGATIVE — SIGNIFICANT CHANGE UP
LEUKOCYTE ESTERASE UR-ACNC: NEGATIVE — SIGNIFICANT CHANGE UP
LYMPHOCYTES # BLD AUTO: 2.84 K/UL — SIGNIFICANT CHANGE UP (ref 1–3.3)
LYMPHOCYTES # BLD AUTO: 42.1 % — SIGNIFICANT CHANGE UP (ref 13–44)
MAGNESIUM SERPL-MCNC: 3.1 MG/DL — HIGH (ref 1.6–2.6)
MCHC RBC-ENTMCNC: 27.6 PG — SIGNIFICANT CHANGE UP (ref 27–34)
MCHC RBC-ENTMCNC: 28 PG — SIGNIFICANT CHANGE UP (ref 27–34)
MCHC RBC-ENTMCNC: 31.8 GM/DL — LOW (ref 32–36)
MCHC RBC-ENTMCNC: 32.6 GM/DL — SIGNIFICANT CHANGE UP (ref 32–36)
MCV RBC AUTO: 86 FL — SIGNIFICANT CHANGE UP (ref 80–100)
MCV RBC AUTO: 86.6 FL — SIGNIFICANT CHANGE UP (ref 80–100)
MONOCYTES # BLD AUTO: 0.45 K/UL — SIGNIFICANT CHANGE UP (ref 0–0.9)
MONOCYTES NFR BLD AUTO: 6.7 % — SIGNIFICANT CHANGE UP (ref 2–14)
NEUTROPHILS # BLD AUTO: 3.08 K/UL — SIGNIFICANT CHANGE UP (ref 1.8–7.4)
NEUTROPHILS NFR BLD AUTO: 45.6 % — SIGNIFICANT CHANGE UP (ref 43–77)
NITRITE UR-MCNC: NEGATIVE — SIGNIFICANT CHANGE UP
NRBC # BLD: 0 /100 WBCS — SIGNIFICANT CHANGE UP (ref 0–0)
NRBC # BLD: 0 /100 WBCS — SIGNIFICANT CHANGE UP (ref 0–0)
PH UR: 6 — SIGNIFICANT CHANGE UP (ref 5–8)
PHOSPHATE SERPL-MCNC: 3.7 MG/DL — SIGNIFICANT CHANGE UP (ref 2.5–4.5)
PLATELET # BLD AUTO: 236 K/UL — SIGNIFICANT CHANGE UP (ref 150–400)
PLATELET # BLD AUTO: 248 K/UL — SIGNIFICANT CHANGE UP (ref 150–400)
POTASSIUM SERPL-MCNC: 3.7 MMOL/L — SIGNIFICANT CHANGE UP (ref 3.5–5.3)
POTASSIUM SERPL-SCNC: 3.7 MMOL/L — SIGNIFICANT CHANGE UP (ref 3.5–5.3)
PROT SERPL-MCNC: 6.6 G/DL — SIGNIFICANT CHANGE UP (ref 6–8.3)
PROT UR-MCNC: NEGATIVE MG/DL — SIGNIFICANT CHANGE UP
PROTHROM AB SERPL-ACNC: 13.2 SEC — SIGNIFICANT CHANGE UP (ref 10.6–13.6)
RBC # BLD: 4.07 M/UL — SIGNIFICANT CHANGE UP (ref 3.8–5.2)
RBC # BLD: 4.39 M/UL — SIGNIFICANT CHANGE UP (ref 3.8–5.2)
RBC # FLD: 13.1 % — SIGNIFICANT CHANGE UP (ref 10.3–14.5)
RBC # FLD: 13.2 % — SIGNIFICANT CHANGE UP (ref 10.3–14.5)
SARS-COV-2 IGG+IGM SERPL QL IA: >250 U/ML — HIGH
SARS-COV-2 IGG+IGM SERPL QL IA: POSITIVE
SODIUM SERPL-SCNC: 139 MMOL/L — SIGNIFICANT CHANGE UP (ref 135–145)
SP GR SPEC: 1.01 — SIGNIFICANT CHANGE UP (ref 1.01–1.02)
TROPONIN I SERPL-MCNC: 0 NG/ML — LOW (ref 0.02–0.06)
UROBILINOGEN FLD QL: NEGATIVE MG/DL — SIGNIFICANT CHANGE UP
WBC # BLD: 6.75 K/UL — SIGNIFICANT CHANGE UP (ref 3.8–10.5)
WBC # BLD: 8.16 K/UL — SIGNIFICANT CHANGE UP (ref 3.8–10.5)
WBC # FLD AUTO: 6.75 K/UL — SIGNIFICANT CHANGE UP (ref 3.8–10.5)
WBC # FLD AUTO: 8.16 K/UL — SIGNIFICANT CHANGE UP (ref 3.8–10.5)

## 2021-06-14 PROCEDURE — 99232 SBSQ HOSP IP/OBS MODERATE 35: CPT | Mod: GC

## 2021-06-14 PROCEDURE — 78580 LUNG PERFUSION IMAGING: CPT | Mod: 26

## 2021-06-14 PROCEDURE — 76775 US EXAM ABDO BACK WALL LIM: CPT | Mod: 26

## 2021-06-14 PROCEDURE — 74177 CT ABD & PELVIS W/CONTRAST: CPT | Mod: 26

## 2021-06-14 PROCEDURE — 93970 EXTREMITY STUDY: CPT | Mod: 26

## 2021-06-14 RX ORDER — OXYCODONE HYDROCHLORIDE 5 MG/1
5 TABLET ORAL EVERY 6 HOURS
Refills: 0 | Status: DISCONTINUED | OUTPATIENT
Start: 2021-06-14 | End: 2021-06-15

## 2021-06-14 RX ADMIN — OXYCODONE HYDROCHLORIDE 5 MILLIGRAM(S): 5 TABLET ORAL at 14:25

## 2021-06-14 RX ADMIN — OXYCODONE HYDROCHLORIDE 5 MILLIGRAM(S): 5 TABLET ORAL at 15:25

## 2021-06-14 RX ADMIN — ENOXAPARIN SODIUM 140 MILLIGRAM(S): 100 INJECTION SUBCUTANEOUS at 08:55

## 2021-06-14 NOTE — PROGRESS NOTE ADULT - ASSESSMENT
45F with morbid obesity, psoriasis, varicose veins in her left leg, kidney stones, possibly HTN/HLD (not on meds) who presented for chest pain.      Chest pain - her d-dimer was elevated but unfortunately her CTA was non-diagnostic.  Patient is therefore being admitted for a VQ scan and possibly TTE.  EKG normal  - admit to telemetry to monitor for arrhythmias  - repeat troponins  - already received lovenox 140mg subq empirically  - will continue lovenox for now   - monitor for bleeding  - spoke to cardiology (Dr. Marquez) regarding current plan -> VQ pending   - VQ ordered  - pain control as needed    Elevated d-dimer - multiple possible causes such as PE.  DVT also needs to be ruled out as well since she is morbidly obese and has varicose veins in her left leg.  Elevated d-dimer can also be caused by her "inflammation" that her OB/GYN was concerned about and suggested surgery (malignancy?  patient does not know what the surgery is for).    - will be anti-coaguated with lovenox  - will check US dopplers for DVTs  - try to obtain collateral information from OB/GYN    Back pain/Kidney stones - had bilateral punctate nonobstructive renal calculi in 2020, Cr normal currently  - will obtain renal US to evaluate (cannot get CT 2/2 recent contrast dye load)  - order UA    Preventive measures  - will be therapeutically dosed with lovenox  45F with morbid obesity, psoriasis, varicose veins in her left leg, kidney stones, possibly HTN/HLD (not on meds) who presented for chest pain.      Chest pain - her d-dimer was elevated but unfortunately her CTA was non-diagnostic.  Patient is therefore being admitted for a VQ scan and possibly TTE.  EKG normal  - already received lovenox 140mg subq empirically  - will continue lovenox for now   - monitor for bleeding  - spoke to cardiology (Dr. Marquez) regarding current plan -> VQ pending   - VQ ordered negative for PE  - pain control as needed    vaginal bleed  has mesnutral period  likely worsening bleeding for full dose ac  will stop  monitor hh     Elevated d-dimer - multiple possible causes such as PE.  DVT also needs to be ruled out as well since she is morbidly obese and has varicose veins in her left leg.  Elevated d-dimer can also be caused by her "inflammation" that her OB/GYN was concerned about and suggested surgery (malignancy?  patient does not know what the surgery is for).    - will be anti-coaguated with lovenox  - will check US dopplers for DVTs  - try to obtain collateral information from OB/GYN    Back pain/Kidney stones - had bilateral punctate nonobstructive renal calculi in 2020, Cr normal currently  - will obtain renal US to evaluate (cannot get CT 2/2 recent contrast dye load)  - order UA

## 2021-06-14 NOTE — PROGRESS NOTE ADULT - ASSESSMENT
The patient is a 45 year old female with a history of HTN, HL, kidney stones who presents with chest pain and shortness of breath.    Plan:  - ECG with no evidence of ischemia or infarction  - Cardiac enzymes negative  - D-dimer elevated at 471  - CTA chest non-diagnostic  - V/Q scan pending

## 2021-06-15 ENCOUNTER — TRANSCRIPTION ENCOUNTER (OUTPATIENT)
Age: 46
End: 2021-06-15

## 2021-06-15 VITALS
DIASTOLIC BLOOD PRESSURE: 67 MMHG | RESPIRATION RATE: 16 BRPM | OXYGEN SATURATION: 96 % | SYSTOLIC BLOOD PRESSURE: 107 MMHG | HEART RATE: 85 BPM

## 2021-06-15 LAB
ALBUMIN SERPL ELPH-MCNC: 3.1 G/DL — LOW (ref 3.3–5)
ALP SERPL-CCNC: 54 U/L — SIGNIFICANT CHANGE UP (ref 30–120)
ALT FLD-CCNC: 23 U/L DA — SIGNIFICANT CHANGE UP (ref 10–60)
ANION GAP SERPL CALC-SCNC: 7 MMOL/L — SIGNIFICANT CHANGE UP (ref 5–17)
AST SERPL-CCNC: 17 U/L — SIGNIFICANT CHANGE UP (ref 10–40)
BILIRUB SERPL-MCNC: 0.3 MG/DL — SIGNIFICANT CHANGE UP (ref 0.2–1.2)
BUN SERPL-MCNC: 12 MG/DL — SIGNIFICANT CHANGE UP (ref 7–23)
CALCIUM SERPL-MCNC: 8.8 MG/DL — SIGNIFICANT CHANGE UP (ref 8.4–10.5)
CHLORIDE SERPL-SCNC: 102 MMOL/L — SIGNIFICANT CHANGE UP (ref 96–108)
CO2 SERPL-SCNC: 26 MMOL/L — SIGNIFICANT CHANGE UP (ref 22–31)
CREAT SERPL-MCNC: 0.61 MG/DL — SIGNIFICANT CHANGE UP (ref 0.5–1.3)
GLUCOSE SERPL-MCNC: 103 MG/DL — HIGH (ref 70–99)
HCT VFR BLD CALC: 35.4 % — SIGNIFICANT CHANGE UP (ref 34.5–45)
HGB BLD-MCNC: 11.5 G/DL — SIGNIFICANT CHANGE UP (ref 11.5–15.5)
MCHC RBC-ENTMCNC: 27.7 PG — SIGNIFICANT CHANGE UP (ref 27–34)
MCHC RBC-ENTMCNC: 32.5 GM/DL — SIGNIFICANT CHANGE UP (ref 32–36)
MCV RBC AUTO: 85.3 FL — SIGNIFICANT CHANGE UP (ref 80–100)
NRBC # BLD: 0 /100 WBCS — SIGNIFICANT CHANGE UP (ref 0–0)
PLATELET # BLD AUTO: 238 K/UL — SIGNIFICANT CHANGE UP (ref 150–400)
POTASSIUM SERPL-MCNC: 3.7 MMOL/L — SIGNIFICANT CHANGE UP (ref 3.5–5.3)
POTASSIUM SERPL-SCNC: 3.7 MMOL/L — SIGNIFICANT CHANGE UP (ref 3.5–5.3)
PROT SERPL-MCNC: 6.9 G/DL — SIGNIFICANT CHANGE UP (ref 6–8.3)
RBC # BLD: 4.15 M/UL — SIGNIFICANT CHANGE UP (ref 3.8–5.2)
RBC # FLD: 13 % — SIGNIFICANT CHANGE UP (ref 10.3–14.5)
SODIUM SERPL-SCNC: 135 MMOL/L — SIGNIFICANT CHANGE UP (ref 135–145)
WBC # BLD: 6.5 K/UL — SIGNIFICANT CHANGE UP (ref 3.8–10.5)
WBC # FLD AUTO: 6.5 K/UL — SIGNIFICANT CHANGE UP (ref 3.8–10.5)

## 2021-06-15 PROCEDURE — 99285 EMERGENCY DEPT VISIT HI MDM: CPT | Mod: 25

## 2021-06-15 PROCEDURE — 99239 HOSP IP/OBS DSCHRG MGMT >30: CPT

## 2021-06-15 PROCEDURE — 84100 ASSAY OF PHOSPHORUS: CPT

## 2021-06-15 PROCEDURE — 83735 ASSAY OF MAGNESIUM: CPT

## 2021-06-15 PROCEDURE — 71275 CT ANGIOGRAPHY CHEST: CPT

## 2021-06-15 PROCEDURE — 71046 X-RAY EXAM CHEST 2 VIEWS: CPT

## 2021-06-15 PROCEDURE — 93970 EXTREMITY STUDY: CPT

## 2021-06-15 PROCEDURE — 84484 ASSAY OF TROPONIN QUANT: CPT

## 2021-06-15 PROCEDURE — 36415 COLL VENOUS BLD VENIPUNCTURE: CPT

## 2021-06-15 PROCEDURE — 84702 CHORIONIC GONADOTROPIN TEST: CPT

## 2021-06-15 PROCEDURE — 86901 BLOOD TYPING SEROLOGIC RH(D): CPT

## 2021-06-15 PROCEDURE — 85379 FIBRIN DEGRADATION QUANT: CPT

## 2021-06-15 PROCEDURE — 85730 THROMBOPLASTIN TIME PARTIAL: CPT

## 2021-06-15 PROCEDURE — 81001 URINALYSIS AUTO W/SCOPE: CPT

## 2021-06-15 PROCEDURE — 83690 ASSAY OF LIPASE: CPT

## 2021-06-15 PROCEDURE — 81025 URINE PREGNANCY TEST: CPT

## 2021-06-15 PROCEDURE — 80053 COMPREHEN METABOLIC PANEL: CPT

## 2021-06-15 PROCEDURE — 87635 SARS-COV-2 COVID-19 AMP PRB: CPT

## 2021-06-15 PROCEDURE — 93005 ELECTROCARDIOGRAM TRACING: CPT

## 2021-06-15 PROCEDURE — 86769 SARS-COV-2 COVID-19 ANTIBODY: CPT

## 2021-06-15 PROCEDURE — 78580 LUNG PERFUSION IMAGING: CPT

## 2021-06-15 PROCEDURE — 76775 US EXAM ABDO BACK WALL LIM: CPT

## 2021-06-15 PROCEDURE — 85027 COMPLETE CBC AUTOMATED: CPT

## 2021-06-15 PROCEDURE — 85025 COMPLETE CBC W/AUTO DIFF WBC: CPT

## 2021-06-15 PROCEDURE — 74177 CT ABD & PELVIS W/CONTRAST: CPT

## 2021-06-15 PROCEDURE — 86900 BLOOD TYPING SEROLOGIC ABO: CPT

## 2021-06-15 PROCEDURE — 86850 RBC ANTIBODY SCREEN: CPT

## 2021-06-15 PROCEDURE — A9540: CPT

## 2021-06-15 PROCEDURE — 85610 PROTHROMBIN TIME: CPT

## 2021-06-15 RX ADMIN — OXYCODONE HYDROCHLORIDE 5 MILLIGRAM(S): 5 TABLET ORAL at 13:07

## 2021-06-15 RX ADMIN — OXYCODONE HYDROCHLORIDE 5 MILLIGRAM(S): 5 TABLET ORAL at 13:53

## 2021-06-15 NOTE — DISCHARGE NOTE PROVIDER - CARE PROVIDER_API CALL
Hal Marquez)  Cardiovascular Disease; Internal Medicine  [address]  [place] [address]  Phone: [phone]  Fax: [phone]  Follow Up Time:

## 2021-06-15 NOTE — PROGRESS NOTE ADULT - SUBJECTIVE AND OBJECTIVE BOX
Patient is a 45y old  Female who presents with a chief complaint of chest pain (2021 20:02)        HPI:  45F with morbid obesity, psoriasis, varicose veins in her left leg, kidney stones, possibly HTN/HLD (not on meds) who presented for chest pain.  Symptoms started with some SOB in the morning.  Tried taking some nasal decongestion with no relief.  Also felt like she had something in her throat and had an episode of hemotpysis (none since).  Then later in the morning, she started to experience left sided chest pain.  Described as a pressure-like 5/10 waxing and waning non-radiating chest pain.  No known alleviated or aggravating factors.  Patient also reports no recent travels or long car/bus rides.  No recent surgeries.  Patient denies being on any oral contraceptives or hormonal replacement medications.  No known personal or family history of blood clots.  Patient also complained of some right sided headaches.  Also has been having mid to lower back pain for the past 3 days.  Daughter reports that the patient was having "kidney stone" pain the other day as well.  No nausea/vomiting.  No abdominal pain or diarrhea.  In the ED, patient had a negative troponin but an elevated d-dimer of 471.  However her CTA was non-diagnostic.  Patient is being admitted for a VQ scan to help rule out a PE.  Patient was started on therapeutic lovenox by the ED for presumptive PE still.  Currently the patient said her chest pain is nearly gone.   (2021 20:02)      SUBJECTIVE & OBJECTIVE: Pt seen and examined at bedside. decfrease air entry at the bases     PHYSICAL EXAM:  T(C): 36.8 (21 @ 10:44), Max: 37.1 (21 @ 19:21)  HR: 74 (21 @ 10:44) (63 - 92)  BP: 115/80 (21 @ 10:44) (103/68 - 140/76)  RR: 18 (21 @ 10:44) (16 - 20)  SpO2: 98% (21 @ 10:44) (96% - 99%)  Wt(kg): -- Height (cm): 160 ( @ 19:21)  Weight (kg): 138.1 ( @ 22:33)  BMI (kg/m2): 53.9 ( @ 22:33)  BSA (m2): 2.31 ( @ 22:33)  GENERAL: NAD, well-groomed, well-developed  HEAD:  Atraumatic, Normocephalic  NERVOUS SYSTEM:  Alert & Oriented X3,  CHEST/LUNG: Clear to auscultation bilaterally; No rales, rhonchi, wheezing, or rubs  HEART: Regular rate and rhythm; No murmurs, rubs, or gallops  ABDOMEN: Soft, Nontender, Nondistended; Bowel sounds present  EXTREMITIES:  2+ Peripheral Pulses, No clubbing, cyanosis, or edema        MEDICATIONS  (STANDING):  enoxaparin Injectable 140 milliGRAM(s) SubCutaneous every 12 hours    MEDICATIONS  (PRN):      LABS:                        11.4   6.75  )-----------( 236      ( 2021 06:25 )             35.0     06-14    139  |  105  |  10  ----------------------------<  109<H>  3.7   |  26  |  0.66    Ca    8.3<L>      2021 06:25  Phos  3.7     -  Mg     3.1     -    TPro  6.6  /  Alb  3.0<L>  /  TBili  0.3  /  DBili  x   /  AST  15  /  ALT  16  /  AlkPhos  57  06-14    PT/INR - ( 2021 06:25 )   PT: 13.2 sec;   INR: 1.10 ratio         PTT - ( 2021 06:25 )  PTT:37.9 sec  Urinalysis Basic - ( 2021 00:56 )    Color: Yellow / Appearance: Clear / S.015 / pH: x  Gluc: x / Ketone: Negative  / Bili: Negative / Urobili: Negative mg/dL   Blood: x / Protein: Negative mg/dL / Nitrite: Negative   Leuk Esterase: Negative / RBC: 0-2 /HPF / WBC 0-2   Sq Epi: x / Non Sq Epi: Many / Bacteria: Occasional      Magnesium, Serum: 3.1 mg/dL ( @ 06:25)    CAPILLARY BLOOD GLUCOSE          CAPILLARY BLOOD GLUCOSE        CAPILLARY BLOOD GLUCOSE          CARDIAC MARKERS ( 2021 06:25 )  .000 ng/mL / x     / x     / x     / x      CARDIAC MARKERS ( 2021 17:36 )  .004 ng/mL / x     / x     / x     / x      CARDIAC MARKERS ( 2021 14:29 )  .000 ng/mL / x     / x     / x     / x            RECENT CULTURES:      RADIOLOGY & ADDITIONAL TESTS:                        DVT/GI ppx  Discussed with pt @ bedside
Chief Complaint: Chest pain, shortness of breath    Interval Events: No events overnight.    Review of Systems:  General: No fevers, chills, weight loss or gain  Skin: No rashes, color changes  Cardiovascular: No chest pain, orthopnea  Respiratory: No shortness of breath, cough  Gastrointestinal: No nausea, abdominal pain  Genitourinary: No incontinence, pain with urination  Musculoskeletal: No pain, swelling, decreased range of motion  Neurological: No headache, weakness  Psychiatric: No depression, anxiety  Endocrine: No weight loss or gain, increased thirst  All other systems are comprehensively negative.    Physical Exam:  Vitals:        Vital Signs Last 24 Hrs  T(C): 36.5 (14 Jun 2021 05:09), Max: 37.1 (13 Jun 2021 19:21)  T(F): 97.7 (14 Jun 2021 05:09), Max: 98.7 (13 Jun 2021 19:21)  HR: 66 (14 Jun 2021 05:09) (63 - 92)  BP: 103/70 (14 Jun 2021 05:09) (103/68 - 140/76)  BP(mean): 81 (13 Jun 2021 22:09) (81 - 81)  RR: 18 (14 Jun 2021 05:09) (16 - 20)  SpO2: 98% (14 Jun 2021 05:09) (96% - 99%)  General: NAD  HEENT: MMM  Neck: No JVD, no carotid bruit  Lungs: CTAB  CV: RRR, nl S1/S2, no M/R/G  Abdomen: S/NT/ND, +BS  Extremities: No LE edema, no cyanosis  Neuro: AAOx3, non-focal  Skin: No rash    Labs:                        11.4   6.75  )-----------( 236      ( 14 Jun 2021 06:25 )             35.0     06-14    139  |  105  |  10  ----------------------------<  109<H>  3.7   |  26  |  0.66    Ca    8.3<L>      14 Jun 2021 06:25  Phos  3.7     06-14  Mg     3.1     06-14    TPro  6.6  /  Alb  3.0<L>  /  TBili  0.3  /  DBili  x   /  AST  15  /  ALT  16  /  AlkPhos  57  06-14    CARDIAC MARKERS ( 14 Jun 2021 06:25 )  .000 ng/mL / x     / x     / x     / x      CARDIAC MARKERS ( 13 Jun 2021 17:36 )  .004 ng/mL / x     / x     / x     / x      CARDIAC MARKERS ( 13 Jun 2021 14:29 )  .000 ng/mL / x     / x     / x     / x          PT/INR - ( 14 Jun 2021 06:25 )   PT: 13.2 sec;   INR: 1.10 ratio         PTT - ( 14 Jun 2021 06:25 )  PTT:37.9 sec    Telemetry: Sinus rhythm
Chief Complaint: Chest pain, shortness of breath    Interval Events: No events overnight.    Review of Systems:  General: No fevers, chills, weight loss or gain  Skin: No rashes, color changes  Cardiovascular: No chest pain, orthopnea  Respiratory: No shortness of breath, cough  Gastrointestinal: No nausea, abdominal pain  Genitourinary: No incontinence, pain with urination  Musculoskeletal: No pain, swelling, decreased range of motion  Neurological: No headache, weakness  Psychiatric: No depression, anxiety  Endocrine: No weight loss or gain, increased thirst  All other systems are comprehensively negative.    Physical Exam:  Vital Signs Last 24 Hrs  T(C): 36.8 (15 Noah 2021 05:37), Max: 37.3 (14 Jun 2021 21:56)  T(F): 98.2 (15 Noah 2021 05:37), Max: 99.2 (14 Jun 2021 21:56)  HR: 65 (15 Noah 2021 05:37) (65 - 89)  BP: 105/68 (15 Noah 2021 05:37) (96/68 - 130/80)  BP(mean): --  RR: 17 (15 Noah 2021 05:37) (17 - 18)  SpO2: 97% (15 Noah 2021 05:37) (95% - 100%)  General: NAD  HEENT: MMM  Neck: No JVD, no carotid bruit  Lungs: CTAB  CV: RRR, nl S1/S2, no M/R/G  Abdomen: S/NT/ND, +BS  Extremities: No LE edema, no cyanosis  Neuro: AAOx3, non-focal  Skin: No rash    Labs:             06-14    139  |  105  |  10  ----------------------------<  109<H>  3.7   |  26  |  0.66    Ca    8.3<L>      14 Jun 2021 06:25  Phos  3.7     06-14  Mg     3.1     06-14    TPro  6.6  /  Alb  3.0<L>  /  TBili  0.3  /  DBili  x   /  AST  15  /  ALT  16  /  AlkPhos  57  06-14                        12.1   8.16  )-----------( 248      ( 14 Jun 2021 16:32 )             38.0       Telemetry: Sinus rhythm

## 2021-06-15 NOTE — PROGRESS NOTE ADULT - ASSESSMENT
The patient is a 45 year old female with a history of HTN, HL, kidney stones who presents with chest pain and shortness of breath.    Plan:  - ECG with no evidence of ischemia or infarction  - Cardiac enzymes negative  - D-dimer elevated at 471  - CTA chest non-diagnostic  - V/Q scan very low prob for PE  - Discharge planning

## 2021-06-15 NOTE — DISCHARGE NOTE PROVIDER - HOSPITAL COURSE
HPI:  45F with morbid obesity, psoriasis, varicose veins in her left leg, kidney stones, possibly HTN/HLD (not on meds) who presented for chest pain.  Symptoms started with some SOB in the morning.  Tried taking some nasal decongestion with no relief.  Also felt like she had something in her throat and had an episode of hemotpysis (none since).  Then later in the morning, she started to experience left sided chest pain.  Described as a pressure-like 5/10 waxing and waning non-radiating chest pain.  No known alleviated or aggravating factors.  Patient also reports no recent travels or long car/bus rides.  No recent surgeries.  Patient denies being on any oral contraceptives or hormonal replacement medications.  No known personal or family history of blood clots.  Patient also complained of some right sided headaches.  Also has been having mid to lower back pain for the past 3 days.  Daughter reports that the patient was having "kidney stone" pain the other day as well.  No nausea/vomiting.  No abdominal pain or diarrhea.  In the ED, patient had a negative troponin but an elevated d-dimer of 471.  However her CTA was non-diagnostic.  Patient is being admitted for a VQ scan to help rule out a PE.  Patient was started on therapeutic lovenox by the ED for presumptive PE still.  Currently the patient said her chest pain is nearly gone.   (13 Jun 2021 20:02)        45F with morbid obesity, psoriasis, varicose veins in her left leg, kidney stones, possibly HTN/HLD (not on meds) who presented for chest pain.      Chest pain - her d-dimer was elevated but unfortunately her CTA was non-diagnostic.  Patient is therefore being admitted for a VQ scan and possibly TTE.  EKG normal  - already received lovenox 140mg subq empirically  Dc'ed Lovenox after VQ was negative for PE.   Per Cardiology   ECG with no evidence of ischemia or infarction  - Cardiac enzymes negative  - D-dimer elevated at 471  - CTA chest non-diagnostic  - V/Q scan very low prob for PE    vaginal bleed  has menstrual  period  likely worsening bleeding for full dose ac,   H and H was stable upon discharge    Elevated d-dimer - multiple possible causes such as PE.  DVT also needs to be ruled out as well since she is morbidly obese and has varicose veins in her left leg.  Elevated d-dimer can also be caused by her "inflammation" that her OB/GYN was concerned about and suggested surgery (malignancy?  patient does not know what the surgery is for).    pt to f/u with OB/GYN as outpatient.     Back pain/Kidney stones - had bilateral punctate nonobstructive renal calculi in 2020, Cr normal currently  - obtained renal US to evaluate- No sonographic evidence for hydronephrosis.  U/A unremarkable.     Vital Signs Last 24 Hrs  T(C): 36.7 (15 Noah 2021 09:41), Max: 37.3 (14 Jun 2021 21:56)  T(F): 98 (15 Noah 2021 09:41), Max: 99.2 (14 Jun 2021 21:56)  HR: 69 (15 Noah 2021 09:43) (65 - 89)  BP: 98/62 (15 Noah 2021 09:41) (96/68 - 130/80)  BP(mean): --  RR: 18 (15 Noah 2021 09:41) (17 - 18)  SpO2: 94% (15 Noah 2021 09:41) (94% - 100%)    GENERAL: NAD, well-groomed, well-developed  HEAD:  Atraumatic, Normocephalic  NERVOUS SYSTEM:  Alert & Oriented X3,  CHEST/LUNG: Clear to auscultation bilaterally; No rales, rhonchi, wheezing, or rubs  HEART: Regular rate and rhythm; No murmurs, rubs, or gallops  ABDOMEN: Soft, Nontender, Nondistended; Bowel sounds present  EXTREMITIES:  2+ Peripheral Pulses, No clubbing, cyanosis, or edema   HPI:  45F with morbid obesity, psoriasis, varicose veins in her left leg, kidney stones, possibly HTN/HLD (not on meds) who presented for chest pain.  Symptoms started with some SOB in the morning.  Tried taking some nasal decongestion with no relief.  Also felt like she had something in her throat and had an episode of hemotpysis (none since).  Then later in the morning, she started to experience left sided chest pain.  Described as a pressure-like 5/10 waxing and waning non-radiating chest pain.  No known alleviated or aggravating factors.  Patient also reports no recent travels or long car/bus rides.  No recent surgeries.  Patient denies being on any oral contraceptives or hormonal replacement medications.  No known personal or family history of blood clots.  Patient also complained of some right sided headaches.  Also has been having mid to lower back pain for the past 3 days.  Daughter reports that the patient was having "kidney stone" pain the other day as well.  No nausea/vomiting.  No abdominal pain or diarrhea.  In the ED, patient had a negative troponin but an elevated d-dimer of 471.  However her CTA was non-diagnostic.  Patient is being admitted for a VQ scan to help rule out a PE.  Patient was started on therapeutic lovenox by the ED for presumptive PE still.  Currently the patient said her chest pain is nearly gone.   (13 Jun 2021 20:02)        45F with morbid obesity, psoriasis, varicose veins in her left leg, kidney stones, possibly HTN/HLD (not on meds) who presented for chest pain.      Chest pain - her d-dimer was elevated but unfortunately her CTA was non-diagnostic.  Patient is therefore being admitted for a VQ scan and possibly TTE.  EKG normal  - already received lovenox 140mg subq empirically  Dc'ed Lovenox after VQ was negative for PE.   Per Cardiology   ECG with no evidence of ischemia or infarction  - Cardiac enzymes negative  - D-dimer elevated at 471  - CTA chest non-diagnostic  - V/Q scan very low prob for PE    vaginal bleed  has menstrual  period  likely worsening bleeding for full dose ac,   H and H was stable upon discharge    Elevated d-dimer - multiple possible causes such as PE.  DVT also needs to be ruled out as well since she is morbidly obese and has varicose veins in her left leg.  Elevated d-dimer can also be caused by her "inflammation" that her OB/GYN was concerned about and suggested surgery (malignancy?  patient does not know what the surgery is for).    pt to f/u with OB/GYN as outpatient.     Back pain/Kidney stones - had bilateral punctate nonobstructive renal calculi in 2020, Cr normal currently  - obtained renal US to evaluate- No sonographic evidence for hydronephrosis.  U/A unremarkable.     Vital Signs Last 24 Hrs  T(C): 36.7 (15 Noah 2021 09:41), Max: 37.3 (14 Jun 2021 21:56)  T(F): 98 (15 Noah 2021 09:41), Max: 99.2 (14 Jun 2021 21:56)  HR: 69 (15 Noah 2021 09:43) (65 - 89)  BP: 98/62 (15 Noah 2021 09:41) (96/68 - 130/80)  BP(mean): --  RR: 18 (15 Noah 2021 09:41) (17 - 18)  SpO2: 94% (15 Noah 2021 09:41) (94% - 100%)    GENERAL: NAD, well-groomed, well-developed  HEAD:  Atraumatic, Normocephalic  NERVOUS SYSTEM:  Alert & Oriented X3,  CHEST/LUNG: Clear to auscultation bilaterally; No rales, rhonchi, wheezing, or rubs  HEART: Regular rate and rhythm; No murmurs, rubs, or gallops  ABDOMEN: Soft, Nontender, Nondistended; Bowel sounds present  EXTREMITIES:  2+ Peripheral Pulses, No clubbing, cyanosis, or edema      Spoke to daughter and patient and updated them on status of pt's condition.  She was stable for discharge.

## 2021-06-15 NOTE — DISCHARGE NOTE NURSING/CASE MANAGEMENT/SOCIAL WORK - PATIENT PORTAL LINK FT
You can access the FollowMyHealth Patient Portal offered by [facility] by registering at the following website: [email or web address]. By joining [employer]’s FollowMyHealth portal, you will also be able to view your health information using other applications (apps) compatible with our system.

## 2021-06-15 NOTE — DISCHARGE NOTE NURSING/CASE MANAGEMENT/SOCIAL WORK - NSDCPNDISPN_GEN_ALL_CORE
Education provided on the pain management plan of care/Side effects of pain management treatment/Activities of daily living, including home environment that might     exacerbate pain or reduce effectiveness of the pain management plan of care as well as strategies to address these issues/Safe use, storage and disposal of opioids when prescribed Opioids not applicable/not prescribed

## 2021-06-15 NOTE — DISCHARGE NOTE PROVIDER - NSDCCPCAREPLAN_GEN_ALL_CORE_FT
PRINCIPAL DISCHARGE DIAGNOSIS  Diagnosis: Chest pain  Assessment and Plan of Treatment: Myocardial Infarction ruled out.  You were seen by Cardiology.  You may follow up with your PMD or cardiology in a week.       PRINCIPAL DISCHARGE DIAGNOSIS  Diagnosis: Chest pain  Assessment and Plan of Treatment: Blood clot was ruled out.  Please follow up with cardiology or primary doctor for routine care.

## 2021-06-28 NOTE — ED ADULT NURSE NOTE - PAIN: BODY LOCATION
Modify Regimen: Stop Efudex to the chest and upper arms Continue Regimen: Efudex cream to the nose, left cheek and right brow once a day x7 days: July, aug, sept Detail Level: Detailed Render In Strict Bullet Format?: No Plan: Recommend  consult for chem peels. Modify Regimen: CE Ferulic QAM instead of every 3 days. Continue Regimen: Tret 0.05% cream QHS throat

## 2021-07-10 ENCOUNTER — EMERGENCY (EMERGENCY)
Facility: HOSPITAL | Age: 46
LOS: 1 days | Discharge: HOME | End: 2021-07-10
Attending: EMERGENCY MEDICINE | Admitting: EMERGENCY MEDICINE
Payer: COMMERCIAL

## 2021-07-10 VITALS
SYSTOLIC BLOOD PRESSURE: 120 MMHG | OXYGEN SATURATION: 98 % | RESPIRATION RATE: 22 BRPM | WEIGHT: 293 LBS | HEIGHT: 63 IN | HEART RATE: 83 BPM | DIASTOLIC BLOOD PRESSURE: 75 MMHG | TEMPERATURE: 99 F

## 2021-07-10 DIAGNOSIS — Z90.49 ACQUIRED ABSENCE OF OTHER SPECIFIED PARTS OF DIGESTIVE TRACT: Chronic | ICD-10-CM

## 2021-07-10 DIAGNOSIS — Z98.51 TUBAL LIGATION STATUS: Chronic | ICD-10-CM

## 2021-07-10 DIAGNOSIS — Z98.891 HISTORY OF UTERINE SCAR FROM PREVIOUS SURGERY: Chronic | ICD-10-CM

## 2021-07-10 PROCEDURE — 99283 EMERGENCY DEPT VISIT LOW MDM: CPT

## 2021-07-10 NOTE — ED PROVIDER NOTE - PMH
H/O varicose veins    HLD (hyperlipidemia)    HTN (hypertension)    Kidney stones    Morbid obesity    Psoriasis

## 2021-07-10 NOTE — ED PROVIDER NOTE - OBJECTIVE STATEMENT
45 y.o. F c/o severe menstrual cramping pain and heavy menstrual flow with clots - states her periods became like this about 3 months ago, has seen the gynecologist, had US, was told her uterine lining is thick and she needs to have a scraping to check for cancer, which will be some time next month. also was put on megestrol by the GYN which she takes daily, took 1 tylenol "600mg" today, which did not help the pain

## 2021-07-10 NOTE — ED PROVIDER NOTE - NSFOLLOWUPINSTRUCTIONS_ED_ALL_ED_FT
You are mildly anemic, you can take an over-the-counter iron supplement daily.    Dysmenorrhea    WHAT YOU NEED TO KNOW:    Dysmenorrhea is painful menstrual cramps at or around the time of your monthly period.    Female Reproductive System         DISCHARGE INSTRUCTIONS:    Medicines: You may need any of the following:  •NSAIDs help decrease swelling, pain, and fever. This medicine is available with or without a doctor's order. NSAIDs can cause stomach bleeding or kidney problems in certain people. If you take blood thinner medicine, always ask your healthcare provider if NSAIDs are safe for you. Always read the medicine label and follow directions.      •Birth control medicine may help decrease your pain. This medicine may be birth control pills or an IUD that does not contain copper.      •Take your medicine as directed. Contact your healthcare provider if you think your medicine is not helping or if you have side effects. Tell him or her if you are allergic to any medicine. Keep a list of the medicines, vitamins, and herbs you take. Include the amounts, and when and why you take them. Bring the list or the pill bottles to follow-up visits. Carry your medicine list with you in case of an emergency.      Eat low-fat foods: Increase the amount of vegetables and raw seeds you eat. Ask your healthcare provider if you should take vitamin B or magnesium supplements. These will help decrease your pain. Do not eat dairy products or eggs.    Apply heat: Apply heat on your lower abdomen for 20 to 30 minutes every 2 hours for as many days as directed. Heat helps decrease pain and muscle spasms.    Manage your stress: Stress can make your symptoms worse. Try relaxation exercises, such as deep breathing.    Exercise regularly: Ask your healthcare provider about the best exercise plan for you. Exercise can help decrease pain.    [employer] Walking for Exercise         Keep a record of your pain: Write down when your pain and periods start and stop. Bring the record with you to your follow-up visits.    Do not smoke: Avoid others who smoke. If you smoke, it is never too late to quit. Smoking can increase your risk for dysmenorrhea. Ask your healthcare provider for information if you need help quitting.    Follow up with your healthcare provider or OB-GYN as directed: Write down your questions so you remember to ask them during your visits.    Contact your healthcare provider or OB-GYN if:   •You have anxiety or feel depressed.      •Your periods are early, late, or more painful than usual.      •You have questions or concerns about your condition or care.      Return to the emergency department if:   •You have severe pain.      •You have heavy vaginal bleeding and you feel faint.      •You have sudden chest pain and trouble breathing.

## 2021-07-10 NOTE — ED PROVIDER NOTE - PATIENT PORTAL LINK FT
You can access the FollowMyHealth Patient Portal offered by [facility] by registering at the following website: [email or web address]. By joining [employer]’s FollowMyHealth portal, you will also be able to view your health information using other applications (apps) compatible with our system.

## 2021-07-10 NOTE — ED PROVIDER NOTE - CLINICAL SUMMARY MEDICAL DECISION MAKING FREE TEXT BOX
45 y.o. F c/o severe menstrual cramps with heavy bleeding - this has been 3 months, already undergoing work up - possible endometrial ca - will check cell counts and give pain medication

## 2021-07-11 VITALS
SYSTOLIC BLOOD PRESSURE: 100 MMHG | HEART RATE: 77 BPM | DIASTOLIC BLOOD PRESSURE: 60 MMHG | OXYGEN SATURATION: 98 % | RESPIRATION RATE: 20 BRPM

## 2021-07-11 PROBLEM — I10 ESSENTIAL (PRIMARY) HYPERTENSION: Chronic | Status: ACTIVE | Noted: 2021-06-13

## 2021-07-11 PROBLEM — E78.5 HYPERLIPIDEMIA, UNSPECIFIED: Chronic | Status: ACTIVE | Noted: 2021-06-13

## 2021-07-11 PROBLEM — Z86.79 PERSONAL HISTORY OF OTHER DISEASES OF THE CIRCULATORY SYSTEM: Chronic | Status: ACTIVE | Noted: 2021-06-13

## 2021-07-11 PROBLEM — L40.9 PSORIASIS, UNSPECIFIED: Chronic | Status: ACTIVE | Noted: 2021-06-13

## 2021-07-11 PROBLEM — E66.01 MORBID (SEVERE) OBESITY DUE TO EXCESS CALORIES: Chronic | Status: ACTIVE | Noted: 2021-06-13

## 2021-07-11 LAB
ANION GAP SERPL CALC-SCNC: 8 MMOL/L — SIGNIFICANT CHANGE UP (ref 5–17)
BASOPHILS # BLD AUTO: 0.07 K/UL — SIGNIFICANT CHANGE UP (ref 0–0.2)
BASOPHILS NFR BLD AUTO: 0.7 % — SIGNIFICANT CHANGE UP (ref 0–2)
BUN SERPL-MCNC: 17 MG/DL — SIGNIFICANT CHANGE UP (ref 7–23)
CALCIUM SERPL-MCNC: 8.5 MG/DL — SIGNIFICANT CHANGE UP (ref 8.4–10.5)
CHLORIDE SERPL-SCNC: 106 MMOL/L — SIGNIFICANT CHANGE UP (ref 96–108)
CO2 SERPL-SCNC: 23 MMOL/L — SIGNIFICANT CHANGE UP (ref 22–31)
CREAT SERPL-MCNC: 0.65 MG/DL — SIGNIFICANT CHANGE UP (ref 0.5–1.3)
EOSINOPHIL # BLD AUTO: 0.22 K/UL — SIGNIFICANT CHANGE UP (ref 0–0.5)
EOSINOPHIL NFR BLD AUTO: 2.3 % — SIGNIFICANT CHANGE UP (ref 0–6)
GLUCOSE SERPL-MCNC: 138 MG/DL — HIGH (ref 70–99)
HCT VFR BLD CALC: 33.3 % — LOW (ref 34.5–45)
HGB BLD-MCNC: 10.7 G/DL — LOW (ref 11.5–15.5)
IMM GRANULOCYTES NFR BLD AUTO: 0.2 % — SIGNIFICANT CHANGE UP (ref 0–1.5)
LYMPHOCYTES # BLD AUTO: 3.48 K/UL — HIGH (ref 1–3.3)
LYMPHOCYTES # BLD AUTO: 36.5 % — SIGNIFICANT CHANGE UP (ref 13–44)
MCHC RBC-ENTMCNC: 27.2 PG — SIGNIFICANT CHANGE UP (ref 27–34)
MCHC RBC-ENTMCNC: 32.1 GM/DL — SIGNIFICANT CHANGE UP (ref 32–36)
MCV RBC AUTO: 84.5 FL — SIGNIFICANT CHANGE UP (ref 80–100)
MONOCYTES # BLD AUTO: 0.75 K/UL — SIGNIFICANT CHANGE UP (ref 0–0.9)
MONOCYTES NFR BLD AUTO: 7.9 % — SIGNIFICANT CHANGE UP (ref 2–14)
NEUTROPHILS # BLD AUTO: 5 K/UL — SIGNIFICANT CHANGE UP (ref 1.8–7.4)
NEUTROPHILS NFR BLD AUTO: 52.4 % — SIGNIFICANT CHANGE UP (ref 43–77)
NRBC # BLD: 0 /100 WBCS — SIGNIFICANT CHANGE UP (ref 0–0)
PLATELET # BLD AUTO: 267 K/UL — SIGNIFICANT CHANGE UP (ref 150–400)
POTASSIUM SERPL-MCNC: 4 MMOL/L — SIGNIFICANT CHANGE UP (ref 3.5–5.3)
POTASSIUM SERPL-SCNC: 4 MMOL/L — SIGNIFICANT CHANGE UP (ref 3.5–5.3)
RBC # BLD: 3.94 M/UL — SIGNIFICANT CHANGE UP (ref 3.8–5.2)
RBC # FLD: 13.4 % — SIGNIFICANT CHANGE UP (ref 10.3–14.5)
SODIUM SERPL-SCNC: 137 MMOL/L — SIGNIFICANT CHANGE UP (ref 135–145)
WBC # BLD: 9.54 K/UL — SIGNIFICANT CHANGE UP (ref 3.8–10.5)
WBC # FLD AUTO: 9.54 K/UL — SIGNIFICANT CHANGE UP (ref 3.8–10.5)

## 2021-07-11 PROCEDURE — 80048 BASIC METABOLIC PNL TOTAL CA: CPT

## 2021-07-11 PROCEDURE — 85025 COMPLETE CBC W/AUTO DIFF WBC: CPT

## 2021-07-11 PROCEDURE — 36415 COLL VENOUS BLD VENIPUNCTURE: CPT

## 2021-07-11 PROCEDURE — 86901 BLOOD TYPING SEROLOGIC RH(D): CPT

## 2021-07-11 PROCEDURE — 99284 EMERGENCY DEPT VISIT MOD MDM: CPT | Mod: 25

## 2021-07-11 PROCEDURE — 96374 THER/PROPH/DIAG INJ IV PUSH: CPT

## 2021-07-11 PROCEDURE — 86900 BLOOD TYPING SEROLOGIC ABO: CPT

## 2021-07-11 PROCEDURE — 86850 RBC ANTIBODY SCREEN: CPT

## 2021-07-11 RX ORDER — HYDROMORPHONE HYDROCHLORIDE 2 MG/ML
0.5 INJECTION INTRAMUSCULAR; INTRAVENOUS; SUBCUTANEOUS ONCE
Refills: 0 | Status: DISCONTINUED | OUTPATIENT
Start: 2021-07-11 | End: 2021-07-11

## 2021-07-11 RX ADMIN — HYDROMORPHONE HYDROCHLORIDE 0.5 MILLIGRAM(S): 2 INJECTION INTRAMUSCULAR; INTRAVENOUS; SUBCUTANEOUS at 00:32

## 2021-07-11 RX ADMIN — HYDROMORPHONE HYDROCHLORIDE 0.5 MILLIGRAM(S): 2 INJECTION INTRAMUSCULAR; INTRAVENOUS; SUBCUTANEOUS at 00:47

## 2021-07-11 NOTE — ED ADULT NURSE NOTE - NURSING GU BLADDER
25/F BIB FAMILY C/O VAGINAL BLEEDING W/ CLOTTS X THIS MORNING; PT STATES HAD 
VAGINAL BLEEDING X LAST NIGHT; PT STATES ABOUT 1-2  MONTHS PREGNANT, W/ FIRST 
APPOINTMENT TOMORROW. PT DENIES N/V/D; SKIN IS PINK/WARM/DRY; AAOX4 WITH EVEN 
AND STEADY GAIT; LUNGS CLEAR BL; HR EVEN AND REGULAR; PT DENIES ANY FEVER, CP, 
SOB, OR COUGH AT THIS TIME; PATIENT STATES  SHARP PAIN OF 8/10 AT THIS TIME; 
VSS; PATIENT POSITIONED FOR COMFORT; HOB ELEVATED; BEDRAILS UP X2; BED DOWN. ER 
MD MADE AWARE OF PT STATUS. non-distended

## 2021-07-11 NOTE — ED ADULT NURSE NOTE - OBJECTIVE STATEMENT
Pt ambulated into the ED rubbing her lower abdomen c/o severe abdominal pain since this afternoon. Pt is being treated for heavy menstrual bleeding for several months but tonight she had worse pain and is passing blood clots. Pt took one Tylenol without any relief. Denies any N/V, no fever

## 2021-09-10 ENCOUNTER — EMERGENCY (EMERGENCY)
Facility: HOSPITAL | Age: 46
LOS: 1 days | Discharge: HOME | End: 2021-09-10
Attending: EMERGENCY MEDICINE | Admitting: EMERGENCY MEDICINE
Payer: COMMERCIAL

## 2021-09-10 VITALS
DIASTOLIC BLOOD PRESSURE: 96 MMHG | HEART RATE: 94 BPM | HEIGHT: 63 IN | OXYGEN SATURATION: 96 % | RESPIRATION RATE: 18 BRPM | TEMPERATURE: 99 F | SYSTOLIC BLOOD PRESSURE: 143 MMHG | WEIGHT: 293 LBS

## 2021-09-10 DIAGNOSIS — Z98.51 TUBAL LIGATION STATUS: Chronic | ICD-10-CM

## 2021-09-10 DIAGNOSIS — Z98.891 HISTORY OF UTERINE SCAR FROM PREVIOUS SURGERY: Chronic | ICD-10-CM

## 2021-09-10 DIAGNOSIS — Z90.49 ACQUIRED ABSENCE OF OTHER SPECIFIED PARTS OF DIGESTIVE TRACT: Chronic | ICD-10-CM

## 2021-09-10 LAB
ALBUMIN SERPL ELPH-MCNC: 3.5 G/DL — SIGNIFICANT CHANGE UP (ref 3.3–5)
ALP SERPL-CCNC: 54 U/L — SIGNIFICANT CHANGE UP (ref 30–120)
ALT FLD-CCNC: 19 U/L DA — SIGNIFICANT CHANGE UP (ref 10–60)
ANION GAP SERPL CALC-SCNC: 6 MMOL/L — SIGNIFICANT CHANGE UP (ref 5–17)
APPEARANCE UR: CLEAR — SIGNIFICANT CHANGE UP
AST SERPL-CCNC: 18 U/L — SIGNIFICANT CHANGE UP (ref 10–40)
BACTERIA # UR AUTO: ABNORMAL
BASOPHILS # BLD AUTO: 0.06 K/UL — SIGNIFICANT CHANGE UP (ref 0–0.2)
BASOPHILS NFR BLD AUTO: 0.6 % — SIGNIFICANT CHANGE UP (ref 0–2)
BILIRUB SERPL-MCNC: 0.1 MG/DL — LOW (ref 0.2–1.2)
BILIRUB UR-MCNC: NEGATIVE — SIGNIFICANT CHANGE UP
BUN SERPL-MCNC: 16 MG/DL — SIGNIFICANT CHANGE UP (ref 7–23)
CALCIUM SERPL-MCNC: 8.7 MG/DL — SIGNIFICANT CHANGE UP (ref 8.4–10.5)
CHLORIDE SERPL-SCNC: 106 MMOL/L — SIGNIFICANT CHANGE UP (ref 96–108)
CO2 SERPL-SCNC: 24 MMOL/L — SIGNIFICANT CHANGE UP (ref 22–31)
COLOR SPEC: YELLOW — SIGNIFICANT CHANGE UP
CREAT SERPL-MCNC: 0.57 MG/DL — SIGNIFICANT CHANGE UP (ref 0.5–1.3)
DIFF PNL FLD: ABNORMAL
EOSINOPHIL # BLD AUTO: 0.26 K/UL — SIGNIFICANT CHANGE UP (ref 0–0.5)
EOSINOPHIL NFR BLD AUTO: 2.8 % — SIGNIFICANT CHANGE UP (ref 0–6)
EPI CELLS # UR: SIGNIFICANT CHANGE UP
GLUCOSE SERPL-MCNC: 119 MG/DL — HIGH (ref 70–99)
GLUCOSE UR QL: NEGATIVE MG/DL — SIGNIFICANT CHANGE UP
HCG SERPL-ACNC: <1 MIU/ML — SIGNIFICANT CHANGE UP
HCG UR QL: NEGATIVE — SIGNIFICANT CHANGE UP
HCT VFR BLD CALC: 35.3 % — SIGNIFICANT CHANGE UP (ref 34.5–45)
HGB BLD-MCNC: 11 G/DL — LOW (ref 11.5–15.5)
IMM GRANULOCYTES NFR BLD AUTO: 0.3 % — SIGNIFICANT CHANGE UP (ref 0–1.5)
KETONES UR-MCNC: NEGATIVE — SIGNIFICANT CHANGE UP
LEUKOCYTE ESTERASE UR-ACNC: ABNORMAL
LYMPHOCYTES # BLD AUTO: 2.78 K/UL — SIGNIFICANT CHANGE UP (ref 1–3.3)
LYMPHOCYTES # BLD AUTO: 29.8 % — SIGNIFICANT CHANGE UP (ref 13–44)
MCHC RBC-ENTMCNC: 25.3 PG — LOW (ref 27–34)
MCHC RBC-ENTMCNC: 31.2 GM/DL — LOW (ref 32–36)
MCV RBC AUTO: 81.1 FL — SIGNIFICANT CHANGE UP (ref 80–100)
MONOCYTES # BLD AUTO: 0.67 K/UL — SIGNIFICANT CHANGE UP (ref 0–0.9)
MONOCYTES NFR BLD AUTO: 7.2 % — SIGNIFICANT CHANGE UP (ref 2–14)
NEUTROPHILS # BLD AUTO: 5.54 K/UL — SIGNIFICANT CHANGE UP (ref 1.8–7.4)
NEUTROPHILS NFR BLD AUTO: 59.3 % — SIGNIFICANT CHANGE UP (ref 43–77)
NITRITE UR-MCNC: NEGATIVE — SIGNIFICANT CHANGE UP
NRBC # BLD: 0 /100 WBCS — SIGNIFICANT CHANGE UP (ref 0–0)
PH UR: 5 — SIGNIFICANT CHANGE UP (ref 5–8)
PLATELET # BLD AUTO: 265 K/UL — SIGNIFICANT CHANGE UP (ref 150–400)
POTASSIUM SERPL-MCNC: 4.4 MMOL/L — SIGNIFICANT CHANGE UP (ref 3.5–5.3)
POTASSIUM SERPL-SCNC: 4.4 MMOL/L — SIGNIFICANT CHANGE UP (ref 3.5–5.3)
PROT SERPL-MCNC: 7.6 G/DL — SIGNIFICANT CHANGE UP (ref 6–8.3)
PROT UR-MCNC: 15 MG/DL
RBC # BLD: 4.35 M/UL — SIGNIFICANT CHANGE UP (ref 3.8–5.2)
RBC # FLD: 14.4 % — SIGNIFICANT CHANGE UP (ref 10.3–14.5)
RBC CASTS # UR COMP ASSIST: >50 /HPF (ref 0–4)
SARS-COV-2 RNA SPEC QL NAA+PROBE: SIGNIFICANT CHANGE UP
SODIUM SERPL-SCNC: 136 MMOL/L — SIGNIFICANT CHANGE UP (ref 135–145)
SP GR SPEC: 1.02 — SIGNIFICANT CHANGE UP (ref 1.01–1.02)
UROBILINOGEN FLD QL: NEGATIVE MG/DL — SIGNIFICANT CHANGE UP
WBC # BLD: 9.34 K/UL — SIGNIFICANT CHANGE UP (ref 3.8–10.5)
WBC # FLD AUTO: 9.34 K/UL — SIGNIFICANT CHANGE UP (ref 3.8–10.5)
WBC UR QL: SIGNIFICANT CHANGE UP

## 2021-09-10 PROCEDURE — 76830 TRANSVAGINAL US NON-OB: CPT

## 2021-09-10 PROCEDURE — 81025 URINE PREGNANCY TEST: CPT

## 2021-09-10 PROCEDURE — 87635 SARS-COV-2 COVID-19 AMP PRB: CPT

## 2021-09-10 PROCEDURE — 36415 COLL VENOUS BLD VENIPUNCTURE: CPT

## 2021-09-10 PROCEDURE — 99285 EMERGENCY DEPT VISIT HI MDM: CPT

## 2021-09-10 PROCEDURE — 96374 THER/PROPH/DIAG INJ IV PUSH: CPT

## 2021-09-10 PROCEDURE — 99284 EMERGENCY DEPT VISIT MOD MDM: CPT | Mod: 25

## 2021-09-10 PROCEDURE — 80053 COMPREHEN METABOLIC PANEL: CPT

## 2021-09-10 PROCEDURE — 81001 URINALYSIS AUTO W/SCOPE: CPT

## 2021-09-10 PROCEDURE — 84702 CHORIONIC GONADOTROPIN TEST: CPT

## 2021-09-10 PROCEDURE — 76830 TRANSVAGINAL US NON-OB: CPT | Mod: 26

## 2021-09-10 PROCEDURE — 96375 TX/PRO/DX INJ NEW DRUG ADDON: CPT

## 2021-09-10 PROCEDURE — 85025 COMPLETE CBC W/AUTO DIFF WBC: CPT

## 2021-09-10 RX ORDER — MEGESTROL ACETATE 40 MG/ML
0 SUSPENSION ORAL
Qty: 0 | Refills: 0 | DISCHARGE

## 2021-09-10 RX ORDER — MORPHINE SULFATE 50 MG/1
4 CAPSULE, EXTENDED RELEASE ORAL ONCE
Refills: 0 | Status: DISCONTINUED | OUTPATIENT
Start: 2021-09-10 | End: 2021-09-10

## 2021-09-10 RX ORDER — ONDANSETRON 8 MG/1
4 TABLET, FILM COATED ORAL ONCE
Refills: 0 | Status: COMPLETED | OUTPATIENT
Start: 2021-09-10 | End: 2021-09-10

## 2021-09-10 RX ORDER — ACETAMINOPHEN 500 MG
650 TABLET ORAL ONCE
Refills: 0 | Status: COMPLETED | OUTPATIENT
Start: 2021-09-10 | End: 2021-09-10

## 2021-09-10 RX ORDER — MEGESTROL ACETATE 40 MG/ML
1 SUSPENSION ORAL
Qty: 30 | Refills: 0
Start: 2021-09-10 | End: 2021-10-09

## 2021-09-10 RX ORDER — SODIUM CHLORIDE 9 MG/ML
1000 INJECTION INTRAMUSCULAR; INTRAVENOUS; SUBCUTANEOUS ONCE
Refills: 0 | Status: COMPLETED | OUTPATIENT
Start: 2021-09-10 | End: 2021-09-10

## 2021-09-10 RX ORDER — KETOROLAC TROMETHAMINE 30 MG/ML
30 SYRINGE (ML) INJECTION ONCE
Refills: 0 | Status: DISCONTINUED | OUTPATIENT
Start: 2021-09-10 | End: 2021-09-10

## 2021-09-10 RX ADMIN — SODIUM CHLORIDE 1000 MILLILITER(S): 9 INJECTION INTRAMUSCULAR; INTRAVENOUS; SUBCUTANEOUS at 17:29

## 2021-09-10 RX ADMIN — Medication 650 MILLIGRAM(S): at 16:02

## 2021-09-10 RX ADMIN — Medication 30 MILLIGRAM(S): at 15:35

## 2021-09-10 RX ADMIN — ONDANSETRON 4 MILLIGRAM(S): 8 TABLET, FILM COATED ORAL at 17:26

## 2021-09-10 RX ADMIN — SODIUM CHLORIDE 1000 MILLILITER(S): 9 INJECTION INTRAMUSCULAR; INTRAVENOUS; SUBCUTANEOUS at 15:35

## 2021-09-10 RX ADMIN — Medication 650 MILLIGRAM(S): at 15:59

## 2021-09-10 RX ADMIN — MORPHINE SULFATE 4 MILLIGRAM(S): 50 CAPSULE, EXTENDED RELEASE ORAL at 17:26

## 2021-09-10 RX ADMIN — MORPHINE SULFATE 4 MILLIGRAM(S): 50 CAPSULE, EXTENDED RELEASE ORAL at 18:00

## 2021-09-10 RX ADMIN — Medication 30 MILLIGRAM(S): at 16:02

## 2021-09-10 NOTE — ED PROVIDER NOTE - PATIENT PORTAL LINK FT
You can access the FollowMyHealth Patient Portal offered by [facility] by registering at the following website: [email or web address]. By joining [employer]’s FollowMyHealth portal, you will also be able to view your health information using other applications (apps) compatible with our system.

## 2021-09-10 NOTE — ED PROVIDER NOTE - PROGRESS NOTE DETAILS
Spoke c STEPHEN Mann for [facility].  Pt had dysmenorrhea and us recently thickened endometrium.  pt needs dc but needs in hospital due to morbid obesity.  signed out Luis Carlos miracle (gyn) seen lizzette pt, requests d/c with rx for megestrol 40mg qd x 30 days and naproxen 500mg bid x 7 days and f/u as outpt with her gyn. miracle (gyn) seen eval pt, requests d/c with rx for megestrol 40mg qd x 30 days and naproxen 500mg bid x 7 days and f/u as outpt with her gyn.  xlator #[id number] Reevaluated patient at bedside.  Patient feeling much improved.  Discussed the results of all diagnostic testing in ED and copies of all reports given.   An opportunity to ask questions was given.  Discussed the importance of prompt, close medical follow-up.  Patient will return with any changes, concerns or persistent / worsening symptoms.  Understanding of all instructions verbalized.

## 2021-09-10 NOTE — CONSULT NOTE ADULT - SUBJECTIVE AND OBJECTIVE BOX
Cc: pelvic pain since start of menses 4 days ago    HPI: 47 yo  Last Menstrual Period 2021 who presented to ED complaining of heavy menses and severe pelvic pain. Patient has been experiencing these symptoms with her period for the past 4 months. Pain is progressively worse each month. Menses can last up to 15 days, including 5 days of heavy menstrual flow and the rest light bleeding to spotting. Not relived by OTC meds. She is a [facility] patient. Was given Megestrol 40 mg to control bleeding until D&C performed (no date set yet). Currently, no dizziness, chest pain, shortness of breath, fever.     Past History:     Ob Hx:  x 3. CD x 2, SAB at 12 wks surgically managed.     Gyn Hx: Menarche 18. Usually regular cycles lasting 5 days until 4 months ago. Denies hx of STIs, myomas, cysts, abnormal Pap smears. Up to date with Gyn checks.    PMH:  Hypertension, no meds, controlling with diet and exercise (has lost almost 40 lbs in 2 months).  Hyperlipidemia  Psoriasis  Morbid obesity  Varicose veins    PSH:  CD x 2, last one with pp sterilization   Open cholecystectomy    Medications:   Megestrol 40 mg   Multivitamin    Allergies  Anesthesia (swelling)  Penicillin (unable to breath)    Social Hx: Denies toxic habits x 3    FAMILY HISTORY:  Diabetes mellitus (Mother)  Heart disease (Father and Aunt)        Review of Systems:   Constitutional: No fever/chills  Respiratory: No Shortness of breath/Chest pain  Cardiac: No Palpitations  : No frequency/dysuria, +vaginal bleeding      Physical exam:   T(F): 98.6 (10 Sep 2021 14:34), Max: 98.6 (10 Sep 2021 14:34)  HR: 94 (10 Sep 2021 14:34) (94 - 94)  BP: 143/96 (10 Sep 2021 14:34) (143/96 - 143/96)  RR: 18 (10 Sep 2021 14:34) (18 - 18)  SpO2: 96% (10 Sep 2021 14:34) (96% - 96%)  Gen: AAO x 3  Abd: Obese, soft, nontender  VE: Oozing from cervical os, dark red blood. No uterine or adnexal tenderness. No CMT.     LABS:                        11.0   9.34  )-----------( 265      ( 10 Sep 2021 15:35 )             35.3     09-10    136  |  106  |  16  ----------------------------<  119<H>  4.4   |  24  |  0.57    Ca    8.7      10 Sep 2021 15:35    TPro  7.6  /  Alb  3.5  /  TBili  0.1<L>  /  DBili  x   /  AST  18  /  ALT  19  /  AlkPhos  54  09-10      Urinalysis Basic - ( 10 Sep 2021 15:56 )    Color: Yellow / Appearance: Clear / S.020 / pH: x  Gluc: x / Ketone: Negative  / Bili: Negative / Urobili: Negative mg/dL   Blood: x / Protein: 15 mg/dL / Nitrite: Negative   Leuk Esterase: Trace / RBC: >50 /HPF / WBC 3-5   Sq Epi: x / Non Sq Epi: Few / Bacteria: Occasional      HCG Quantitative, Serum: <1 mIU/mL (09-10 @ 15:35)        RADIOLOGY & ADDITIONAL STUDIES:    < from: US Transvaginal (09.10.21 @ 16:57) >  EXAM:  US TRANSVAGINAL                                  PROCEDURE DATE:  09/10/2021          INTERPRETATION:  CLINICAL INFORMATION: Painful menstruation    LMP: 09/10/2021    COMPARISON: CT abdomen pelvis dated 2021    TECHNIQUE:  Endovaginalpelvic sonogram only.    FINDINGS:  Technically limited exam due to patient discomfort and patient body habitus.    Uterus: 4.6 x 5.4 x 6.1 cm. Within normal limits.  Endometrium: There is large, complex fluid within the endometrial and endocervical canal. Endometrial cavity is distended up to 3.3 cm.    Ovaries could not be visualized Fluid: None.    IMPRESSION:    Technically limited exam.    Large complex fluid within the endometrial and endocervical canal, concerning for hemorrhage.  Gynecologic consultation and follow-up pelvic MRI is recommended to evaluate for underlying lesion.    Ovaries could not be visualized.    --- End of Report ---              JIL MENDOZA MD; Attending Radiologist  This document has been electronically signed. Sep 10 2021  5:26PM    < end of copied text >

## 2021-09-10 NOTE — ED ADULT NURSE NOTE - TEMPLATE LIST FOR HEAD TO TOE ASSESSMENT
no loss of consciousness, no gait abnormality, no headache, no sensory deficits, and no weakness.
Abdominal Pain, N/V/D

## 2021-09-10 NOTE — CONSULT NOTE ADULT - ASSESSMENT
A: 45 yo  with AUB/menorrhagia and severe dysmenorrhea.  1. AUB/menorrhagia - Endometrium and cervical canal appear thickened on pelvic US, likely due to heavy menstrual flow. Patient is morbidly obese and at risk for endometrial neoplasia. Advised to contact her Gyn ASAP to arrange endometrial sampling. Would benefit from a Hysteroscopy, D&C as outpatient. Would pursue medical clearance prior to procedure.   2. Severe dysmenorrhea - Likely due to distention and inflammation caused by heavy menstrual flow.   3. Mild asymptomatic anemia - Likely due to acute blood loss.     Plan:   Discharge home   Start iron twice daily  Increase water intake   Cont Megestrol but will increase to 40 mg twice daily until vaginal bleeding decreases. Then, restart 40 mg daily for maintenance until procedure done.   NSAID around the clock   Follow up ASAP with Gyn as she needs an endometrial sample and long term management of AUB.  Menstrual calendar  Bleeding precautions       Thanks for consult.

## 2021-09-10 NOTE — ED PROVIDER NOTE - OBJECTIVE STATEMENT
[id number] ID . 45 y/o female with a PMHX of morbid obestiy, HTN, HLD presents to the ED c/o lower abd pain yesterday. Pt was waiting for an appointment  to check cervix for possible cancer. Last bowel movement was  normal 10 min ago. Current menstruating. Has blood clots with periods. No possibility for pregnancy. Denies n/v, fever, no diarrhea. Zainab [facility]. Allergic to penicillin and anesthesia.

## 2021-09-10 NOTE — ED PROVIDER NOTE - NS_ ATTENDINGSCRIBEDETAILS _ED_A_ED_FT
Jeancarlos Ramos MD - The scribe's documentation has been prepared under my direction and personally reviewed by me in its entirety. I confirm that the note above accurately reflects all work, treatment, procedures, and medical decision making performed by me.

## 2021-12-23 NOTE — ED ADULT TRIAGE NOTE - PATIENT ON (OXYGEN DELIVERY METHOD)
room air Cartilage Graft Text: The defect edges were debeveled with a #15 scalpel blade.  Given the location of the defect, shape of the defect, the fact the defect involved a full thickness cartilage defect a cartilage graft was deemed most appropriate.  An appropriate donor site was identified, cleansed, and anesthetized. The cartilage graft was then harvested and transferred to the recipient site, oriented appropriately and then sutured into place.  The secondary defect was then repaired using a primary closure.

## 2022-09-01 NOTE — DISCHARGE NOTE PROVIDER - NSDCHC_MEDRECSTATUS_GEN_ALL_CORE
Please call patient.  Screening lung CT normal/stable.  
Admission Reconciliation is Completed  Discharge Reconciliation is Completed

## 2023-02-15 ENCOUNTER — EMERGENCY (EMERGENCY)
Facility: HOSPITAL | Age: 48
LOS: 1 days | Discharge: HOME | End: 2023-02-15
Attending: EMERGENCY MEDICINE | Admitting: EMERGENCY MEDICINE
Payer: COMMERCIAL

## 2023-02-15 VITALS
RESPIRATION RATE: 14 BRPM | DIASTOLIC BLOOD PRESSURE: 70 MMHG | HEIGHT: 63 IN | SYSTOLIC BLOOD PRESSURE: 110 MMHG | OXYGEN SATURATION: 99 % | HEART RATE: 79 BPM | TEMPERATURE: 98 F | WEIGHT: 293 LBS

## 2023-02-15 VITALS
OXYGEN SATURATION: 97 % | RESPIRATION RATE: 20 BRPM | DIASTOLIC BLOOD PRESSURE: 84 MMHG | SYSTOLIC BLOOD PRESSURE: 146 MMHG | HEART RATE: 92 BPM | TEMPERATURE: 98 F

## 2023-02-15 DIAGNOSIS — Z98.891 HISTORY OF UTERINE SCAR FROM PREVIOUS SURGERY: Chronic | ICD-10-CM

## 2023-02-15 DIAGNOSIS — Z90.710 ACQUIRED ABSENCE OF BOTH CERVIX AND UTERUS: Chronic | ICD-10-CM

## 2023-02-15 DIAGNOSIS — Z90.49 ACQUIRED ABSENCE OF OTHER SPECIFIED PARTS OF DIGESTIVE TRACT: Chronic | ICD-10-CM

## 2023-02-15 DIAGNOSIS — Z98.51 TUBAL LIGATION STATUS: Chronic | ICD-10-CM

## 2023-02-15 LAB
APPEARANCE UR: CLEAR — SIGNIFICANT CHANGE UP
BILIRUB UR-MCNC: NEGATIVE — SIGNIFICANT CHANGE UP
COLOR SPEC: YELLOW — SIGNIFICANT CHANGE UP
DIFF PNL FLD: ABNORMAL
EPI CELLS # UR: ABNORMAL
GLUCOSE UR QL: NEGATIVE MG/DL — SIGNIFICANT CHANGE UP
HCG UR QL: NEGATIVE — SIGNIFICANT CHANGE UP
KETONES UR-MCNC: ABNORMAL
LEUKOCYTE ESTERASE UR-ACNC: ABNORMAL
NITRITE UR-MCNC: POSITIVE
PH UR: 5 — SIGNIFICANT CHANGE UP (ref 5–8)
PROT UR-MCNC: 15 MG/DL
RBC CASTS # UR COMP ASSIST: SIGNIFICANT CHANGE UP /HPF (ref 0–4)
SP GR SPEC: 1.02 — SIGNIFICANT CHANGE UP (ref 1.01–1.02)
UROBILINOGEN FLD QL: NEGATIVE MG/DL — SIGNIFICANT CHANGE UP
WBC UR QL: SIGNIFICANT CHANGE UP

## 2023-02-15 PROCEDURE — 99284 EMERGENCY DEPT VISIT MOD MDM: CPT

## 2023-02-15 PROCEDURE — 81001 URINALYSIS AUTO W/SCOPE: CPT

## 2023-02-15 PROCEDURE — 99283 EMERGENCY DEPT VISIT LOW MDM: CPT

## 2023-02-15 PROCEDURE — 87186 SC STD MICRODIL/AGAR DIL: CPT

## 2023-02-15 PROCEDURE — 87086 URINE CULTURE/COLONY COUNT: CPT

## 2023-02-15 PROCEDURE — 81025 URINE PREGNANCY TEST: CPT

## 2023-02-15 RX ORDER — LIDOCAINE 4 G/100G
1 CREAM TOPICAL ONCE
Refills: 0 | Status: COMPLETED | OUTPATIENT
Start: 2023-02-15 | End: 2023-02-15

## 2023-02-15 RX ORDER — CYCLOBENZAPRINE HYDROCHLORIDE 10 MG/1
10 TABLET, FILM COATED ORAL ONCE
Refills: 0 | Status: COMPLETED | OUTPATIENT
Start: 2023-02-15 | End: 2023-02-15

## 2023-02-15 RX ORDER — CYCLOBENZAPRINE HYDROCHLORIDE 10 MG/1
1 TABLET, FILM COATED ORAL
Qty: 9 | Refills: 0
Start: 2023-02-15 | End: 2023-02-17

## 2023-02-15 RX ADMIN — CYCLOBENZAPRINE HYDROCHLORIDE 10 MILLIGRAM(S): 10 TABLET, FILM COATED ORAL at 17:19

## 2023-02-15 RX ADMIN — LIDOCAINE 1 PATCH: 4 CREAM TOPICAL at 17:20
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
## 2023-02-15 NOTE — ED PROVIDER NOTE - CARE PROVIDER_API CALL
Hugh Shine)  Orthopaedic Sports Medicine; Orthopaedic Surgery  [address]  [place] [address]  Phone: [phone]  Fax: [phone]  Follow Up Time: 1-3 Days

## 2023-02-15 NOTE — ED PROVIDER NOTE - CLINICAL SUMMARY MEDICAL DECISION MAKING FREE TEXT BOX
#[id number]   Patient complaining of worsening non-radiating lower lumbar back pain.  Patient relates has had pain in this area for over 1 year however has worsened recently she went to urgent care had an x-ray which showed an age-indeterminate T11 compression.  Patient relates she does not follow with an orthopedist.  Patient denies fevers chills weakness numbness abdominal pain flank pain dysuria hematuria frequency incontinence or any other complaints.  Patient relates she took Motrin prior to arrival.    Plan urine Lidoderm and Flexeril and refer for outpatient orthopedic follow-up  Differential including but not limited to herniated disc pinched nerve sprain

## 2023-02-15 NOTE — ED ADULT NURSE NOTE - NSICDXPASTMEDICALHX_GEN_ALL_CORE_FT
PAST MEDICAL HISTORY:  Cervical cancer     Gastritis     H/O varicose veins     HLD (hyperlipidemia)     HTN (hypertension)     Kidney stones     Morbid obesity     Psoriasis

## 2023-02-15 NOTE — ED PROVIDER NOTE - TEMPLATE
Received a message from Jerilyn Norris at [address] [place] [address] (skilled nursing) and she is requesting updated PT/OT notes. Call made to therapy department for patient to be seen today for updated PT/OT notes. CT done yesterday showed unchanged right posterior temporal intra-axial hemorrhage.     [place] , MSW, LSW [phone] Back Pain

## 2023-02-15 NOTE — ED PROVIDER NOTE - OBJECTIVE STATEMENT
#[id number]   Patient complaining of worsening non-radiating lower lumbar back pain.  Patient relates has had pain in this area for over 1 year however has worsened recently she went to urgent care had an x-ray which showed an age-indeterminate T11 compression.  Patient relates she does not follow with an orthopedist.  Patient denies fevers chills weakness numbness abdominal pain flank pain dysuria hematuria frequency incontinence or any other complaints.  Patient relates she took Motrin prior to arrival.

## 2023-02-15 NOTE — ED PROVIDER NOTE - PATIENT PORTAL LINK FT
You can access the FollowMyHealth Patient Portal offered by [facility] by registering at the following website: [email or web address]. By joining [facility]’s FollowMyHealth portal, you will also be able to view your health information using other applications (apps) compatible with our system.

## 2023-02-15 NOTE — ED PROVIDER NOTE - NSFOLLOWUPINSTRUCTIONS_ED_ALL_ED_FT
BACK PAIN - AfterCare(R) Instructions(ER/ED)            Dolor de espalda    LO QUE NECESITA SABER:    El dolor de espalda es común. Usted puede tener dolor de espalda y espasmos musculares. Usted puede estar adolorido o sentir rigidez en emilee o ambos lados de la espalda. El dolor se puede propagar a la parte baja del cuerpo. Las condiciones que afectan la columna, las articulaciones, o los músculos pueden causar el dolor de espalda. Estos pueden incluir la artritis, estenosis de la nitin dorsal (estrechamiento de la columna vertebral), tensión muscular o descomposición de los discos de la columna vertebral.    INSTRUCCIONES SOBRE [facility]ITAL[facility]:    Llame al número de emergencias local (911 en los [place]) si:  •Usted tiene dolor de espalda intenso con dolor en el pecho.      •Usted no puede controlar huddleston orina ni osmany deposiciones intestinales.      •El dolor se vuelve tan intenso que lo incapacita para caminar.      Regrese a la sandra de emergencias si:  •Usted tiene dolor, entumecimiento o debilidad en melissa o en ambas piernas.      •Usted tiene dolor de espalda intenso, náuseas y vómito.      •Usted tiene un dolor de espalda intenso que se propaga a un costado o al área genital.      Llame a huddleston médico si:  •Usted tiene dolor de espalda que no mejora con el reposo, ni con el medicamento para el dolor.      •Tiene fiebre.      •Usted tiene un dolor que empeora cuando está acostado boca arriba o al descansar.      •Usted tiene dolor que empeora cuando tose o estornuda.      •Usted pierde peso sin proponérselo.      •Usted tiene preguntas o inquietudes acerca de huddleston condición o cuidado.      Medicamentos:Es posible que usted necesite alguno de los siguientes:   •AINEpueden disminuir la inflamación y el dolor o la fiebre. Dagmar medicamento está disponible con o sin melissa receta médica. Los TOSHA pueden causar sangrado estomacal o problemas renales en ciertas personas. Si usted love un medicamento anticoagulante, siempre pregúntele a huddleston médico si los TOSHA son seguros para usted. Siempre rachell la etiqueta de dagmar medicamento y siga las instrucciones.      •Acetaminofénalivia el dolor y baja la fiebre. Está disponible sin receta médica. Pregunte la cantidad y la frecuencia con que debe tomarlos. Siga las indicaciones. Rachell las etiquetas de todos los demás medicamentos que esté usando para saber si también contienen acetaminofén, o pregunte a huddleston médico o farmacéutico. El acetaminofén puede causar daño en el hígado cuando no se love de forma correcta.      •Relajantes muscularesayudan a disminuir los espasmos musculares y el dolor de espalda.      •Puede administrarsepodrían administrarse. Pregunte al médico cómo debe liliana dagmar medicamento de forma blank. Algunos medicamentos recetados para el dolor contienen acetaminofén. No tome otros medicamentos que contengan acetaminofén sin consultarlo con huddleston médico. Demasiado acetaminofeno puede causar daño al hígado. Los medicamentos recetados para el dolor podrían causar estreñimiento. Pregunte a huddleston médico raymond prevenir o tratar estreñimiento.      •Mobile osmany medicamentos raymond se le haya indicado.Consulte con huddleston médico si usted óscar que huddleston medicamento no le está ayudando o si presenta efectos secundarios. Infórmele al médico si usted es alérgico a algún medicamento. Mantenga melissa lista actualizada de los medicamentos, las vitaminas y los productos herbales que love. Incluya los siguientes datos de los medicamentos: cantidad, frecuencia y motivo de administración. Traiga con usted la lista o los envases de las píldoras a osmany citas de seguimiento. Lleve la lista de los medicamentos con usted en ag de melissa emergencia.      La forma de controlar huddleston dolor de espalda:  •Aplique hieloen la espalda de 15 a 20 minutos cada hora o raymond se le indique. Use melissa compresa de hielo o ponga hielo triturado en melissa bolsa de plástico. Cúbralo con melissa toalla antes de aplicarlo sobre huddleston piel. El hielo disminuye el dolor y ayuda a evitar el daño en los tejidos.      •Aplique caloren la espalda de 20 a 30 minutos cada 2 horas por los días que le indiquen. El calor ayuda a disminuir el dolor y los espasmos musculares.      •Manténgase activolo más que pueda sin causar más dolor. El reposo en cama puede empeorar huddleston dolor de espalda. Evite levantar objetos hasta que ya no tenga dolor.        •Vaya a terapia físicasegún las indicaciones. Un fisioterapeuta puede enseñarle ejercicios que contribuyan a mejorar huddleston movimiento y fuerza, y a aliviar el dolor.      Acuda a melissa consulta de control con huddleston médico dentro de 2 semanas, o según le hayan indicado:Es posible que necesite leonardo a un especialista. Anote osmany preguntas para que se acuerde de hacerlas susanne osmany visitas.

## 2023-02-15 NOTE — ED ADULT NURSE NOTE - NSICDXPASTSURGICALHX_GEN_ALL_CORE_FT
PAST SURGICAL HISTORY:  H/O tubal ligation     H/O:      H/O: hysterectomy     History of cholecystectomy

## 2023-02-15 NOTE — ED PROVIDER NOTE - DIFFERENTIAL DIAGNOSIS
Differential including but not limited to herniated disc pinched nerve sprain Differential Diagnosis

## 2023-02-20 NOTE — ED POST DISCHARGE NOTE - DETAILS
notified emergency contact (daughter sal) she will notify patient, rx sent to their preferred pharmacy

## 2023-03-01 PROBLEM — K29.70 GASTRITIS, UNSPECIFIED, WITHOUT BLEEDING: Chronic | Status: ACTIVE | Noted: 2023-02-15

## 2023-03-01 PROBLEM — C53.9 MALIGNANT NEOPLASM OF CERVIX UTERI, UNSPECIFIED: Chronic | Status: ACTIVE | Noted: 2023-02-15

## 2023-03-08 ENCOUNTER — APPOINTMENT (OUTPATIENT)
Dept: ORTHOPEDIC SURGERY | Facility: CLINIC | Age: 48
End: 2023-03-08
Payer: COMMERCIAL

## 2023-03-08 VITALS
BODY MASS INDEX: 51.91 KG/M2 | HEART RATE: 94 BPM | OXYGEN SATURATION: 98 % | DIASTOLIC BLOOD PRESSURE: 81 MMHG | SYSTOLIC BLOOD PRESSURE: 135 MMHG | HEIGHT: 63 IN | TEMPERATURE: 97.1 F | WEIGHT: 293 LBS

## 2023-03-08 DIAGNOSIS — M54.16 RADICULOPATHY, LUMBAR REGION: ICD-10-CM

## 2023-03-08 DIAGNOSIS — M41.9 SCOLIOSIS, UNSPECIFIED: ICD-10-CM

## 2023-03-08 DIAGNOSIS — M43.16 SPONDYLOLISTHESIS, LUMBAR REGION: ICD-10-CM

## 2023-03-08 PROCEDURE — 72100 X-RAY EXAM L-S SPINE 2/3 VWS: CPT

## 2023-03-08 PROCEDURE — 99203 OFFICE O/P NEW LOW 30 MIN: CPT
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
## 2023-03-08 NOTE — PHYSICAL EXAM
[Antalgic] : antalgic [de-identified] : Examination of the lumbar spine reveals no midline tenderness palpation, step-offs, or skin lesions. Decreased range of motion with respect to flexion, extension, lateral bending, and rotation. No tenderness to palpation of the sciatic notch. No tenderness palpation of the bilateral greater trochanters. No pain with passive internal/external rotation of the hips. No instability of bilateral lower extremities.  Negative JESSICA. Negative straight leg raise bilaterally. No bowstring. Negative femoral stretch. 5 out of 5 iliopsoas, hip abductors, hips adductors, quadriceps, hamstrings, gastrocsoleus, tibialis anterior, extensor hallucis longus, peroneals. Grossly intact sensation to light touch bilateral lower extremities. 1+ patellar and Achilles reflexes. Downgoing Babinski. No clonus. Intact proprioception. Palpable pulses. No skin lesion and no edema on the right and left lower extremities. [de-identified] : AP lateral lumbar x-rays with some mild lumbar scoliosis.  Spondylosis.  Trace L4-5 spondylolisthesis.

## 2023-03-08 NOTE — DISCUSSION/SUMMARY
[de-identified] : We discussed further treatment options.  Given her persistent left-sided radiculopathy and failure to improve with medications and physical therapy we will obtain a lumbar MRI.  Follow-up afterwards.

## 2023-12-27 ENCOUNTER — EMERGENCY (EMERGENCY)
Facility: HOSPITAL | Age: 48
LOS: 1 days | Discharge: HOME | End: 2023-12-27
Attending: EMERGENCY MEDICINE | Admitting: EMERGENCY MEDICINE
Payer: COMMERCIAL

## 2023-12-27 VITALS
HEART RATE: 91 BPM | DIASTOLIC BLOOD PRESSURE: 70 MMHG | OXYGEN SATURATION: 95 % | SYSTOLIC BLOOD PRESSURE: 108 MMHG | TEMPERATURE: 98 F | RESPIRATION RATE: 16 BRPM

## 2023-12-27 VITALS
HEART RATE: 89 BPM | OXYGEN SATURATION: 97 % | RESPIRATION RATE: 17 BRPM | TEMPERATURE: 98 F | WEIGHT: 293 LBS | SYSTOLIC BLOOD PRESSURE: 133 MMHG | DIASTOLIC BLOOD PRESSURE: 84 MMHG | HEIGHT: 63 IN

## 2023-12-27 DIAGNOSIS — Z90.49 ACQUIRED ABSENCE OF OTHER SPECIFIED PARTS OF DIGESTIVE TRACT: Chronic | ICD-10-CM

## 2023-12-27 DIAGNOSIS — Z90.710 ACQUIRED ABSENCE OF BOTH CERVIX AND UTERUS: Chronic | ICD-10-CM

## 2023-12-27 DIAGNOSIS — Z98.891 HISTORY OF UTERINE SCAR FROM PREVIOUS SURGERY: Chronic | ICD-10-CM

## 2023-12-27 DIAGNOSIS — Z98.51 TUBAL LIGATION STATUS: Chronic | ICD-10-CM

## 2023-12-27 LAB
ALBUMIN SERPL ELPH-MCNC: 3.6 G/DL — SIGNIFICANT CHANGE UP (ref 3.3–5)
ALBUMIN SERPL ELPH-MCNC: 3.6 G/DL — SIGNIFICANT CHANGE UP (ref 3.3–5)
ALP SERPL-CCNC: 83 U/L — SIGNIFICANT CHANGE UP (ref 30–120)
ALP SERPL-CCNC: 83 U/L — SIGNIFICANT CHANGE UP (ref 30–120)
ALT FLD-CCNC: 21 U/L — SIGNIFICANT CHANGE UP (ref 10–60)
ALT FLD-CCNC: 21 U/L — SIGNIFICANT CHANGE UP (ref 10–60)
ANION GAP SERPL CALC-SCNC: 10 MMOL/L — SIGNIFICANT CHANGE UP (ref 5–17)
ANION GAP SERPL CALC-SCNC: 10 MMOL/L — SIGNIFICANT CHANGE UP (ref 5–17)
AST SERPL-CCNC: 22 U/L — SIGNIFICANT CHANGE UP (ref 10–40)
AST SERPL-CCNC: 22 U/L — SIGNIFICANT CHANGE UP (ref 10–40)
BASOPHILS # BLD AUTO: 0.04 K/UL — SIGNIFICANT CHANGE UP (ref 0–0.2)
BASOPHILS # BLD AUTO: 0.04 K/UL — SIGNIFICANT CHANGE UP (ref 0–0.2)
BASOPHILS NFR BLD AUTO: 0.5 % — SIGNIFICANT CHANGE UP (ref 0–2)
BASOPHILS NFR BLD AUTO: 0.5 % — SIGNIFICANT CHANGE UP (ref 0–2)
BILIRUB SERPL-MCNC: 0.5 MG/DL — SIGNIFICANT CHANGE UP (ref 0.2–1.2)
BILIRUB SERPL-MCNC: 0.5 MG/DL — SIGNIFICANT CHANGE UP (ref 0.2–1.2)
BUN SERPL-MCNC: 11 MG/DL — SIGNIFICANT CHANGE UP (ref 7–23)
BUN SERPL-MCNC: 11 MG/DL — SIGNIFICANT CHANGE UP (ref 7–23)
CALCIUM SERPL-MCNC: 9.2 MG/DL — SIGNIFICANT CHANGE UP (ref 8.4–10.5)
CALCIUM SERPL-MCNC: 9.2 MG/DL — SIGNIFICANT CHANGE UP (ref 8.4–10.5)
CHLORIDE SERPL-SCNC: 105 MMOL/L — SIGNIFICANT CHANGE UP (ref 96–108)
CHLORIDE SERPL-SCNC: 105 MMOL/L — SIGNIFICANT CHANGE UP (ref 96–108)
CK SERPL-CCNC: 138 U/L — SIGNIFICANT CHANGE UP (ref 26–192)
CK SERPL-CCNC: 138 U/L — SIGNIFICANT CHANGE UP (ref 26–192)
CO2 SERPL-SCNC: 24 MMOL/L — SIGNIFICANT CHANGE UP (ref 22–31)
CO2 SERPL-SCNC: 24 MMOL/L — SIGNIFICANT CHANGE UP (ref 22–31)
CREAT SERPL-MCNC: 0.51 MG/DL — SIGNIFICANT CHANGE UP (ref 0.5–1.3)
CREAT SERPL-MCNC: 0.51 MG/DL — SIGNIFICANT CHANGE UP (ref 0.5–1.3)
D DIMER BLD IA.RAPID-MCNC: 293 NG/ML DDU — HIGH
D DIMER BLD IA.RAPID-MCNC: 293 NG/ML DDU — HIGH
EGFR: 117 ML/MIN/1.73M2 — SIGNIFICANT CHANGE UP
EGFR: 117 ML/MIN/1.73M2 — SIGNIFICANT CHANGE UP
EOSINOPHIL # BLD AUTO: 0.15 K/UL — SIGNIFICANT CHANGE UP (ref 0–0.5)
EOSINOPHIL # BLD AUTO: 0.15 K/UL — SIGNIFICANT CHANGE UP (ref 0–0.5)
EOSINOPHIL NFR BLD AUTO: 2 % — SIGNIFICANT CHANGE UP (ref 0–6)
EOSINOPHIL NFR BLD AUTO: 2 % — SIGNIFICANT CHANGE UP (ref 0–6)
GLUCOSE SERPL-MCNC: 148 MG/DL — HIGH (ref 70–99)
GLUCOSE SERPL-MCNC: 148 MG/DL — HIGH (ref 70–99)
HCT VFR BLD CALC: 39.6 % — SIGNIFICANT CHANGE UP (ref 34.5–45)
HCT VFR BLD CALC: 39.6 % — SIGNIFICANT CHANGE UP (ref 34.5–45)
HGB BLD-MCNC: 12.4 G/DL — SIGNIFICANT CHANGE UP (ref 11.5–15.5)
HGB BLD-MCNC: 12.4 G/DL — SIGNIFICANT CHANGE UP (ref 11.5–15.5)
IMM GRANULOCYTES NFR BLD AUTO: 0.3 % — SIGNIFICANT CHANGE UP (ref 0–0.9)
IMM GRANULOCYTES NFR BLD AUTO: 0.3 % — SIGNIFICANT CHANGE UP (ref 0–0.9)
LYMPHOCYTES # BLD AUTO: 1.32 K/UL — SIGNIFICANT CHANGE UP (ref 1–3.3)
LYMPHOCYTES # BLD AUTO: 1.32 K/UL — SIGNIFICANT CHANGE UP (ref 1–3.3)
LYMPHOCYTES # BLD AUTO: 17.8 % — SIGNIFICANT CHANGE UP (ref 13–44)
LYMPHOCYTES # BLD AUTO: 17.8 % — SIGNIFICANT CHANGE UP (ref 13–44)
MCHC RBC-ENTMCNC: 26.1 PG — LOW (ref 27–34)
MCHC RBC-ENTMCNC: 26.1 PG — LOW (ref 27–34)
MCHC RBC-ENTMCNC: 31.3 GM/DL — LOW (ref 32–36)
MCHC RBC-ENTMCNC: 31.3 GM/DL — LOW (ref 32–36)
MCV RBC AUTO: 83.4 FL — SIGNIFICANT CHANGE UP (ref 80–100)
MCV RBC AUTO: 83.4 FL — SIGNIFICANT CHANGE UP (ref 80–100)
MONOCYTES # BLD AUTO: 0.4 K/UL — SIGNIFICANT CHANGE UP (ref 0–0.9)
MONOCYTES # BLD AUTO: 0.4 K/UL — SIGNIFICANT CHANGE UP (ref 0–0.9)
MONOCYTES NFR BLD AUTO: 5.4 % — SIGNIFICANT CHANGE UP (ref 2–14)
MONOCYTES NFR BLD AUTO: 5.4 % — SIGNIFICANT CHANGE UP (ref 2–14)
NEUTROPHILS # BLD AUTO: 5.49 K/UL — SIGNIFICANT CHANGE UP (ref 1.8–7.4)
NEUTROPHILS # BLD AUTO: 5.49 K/UL — SIGNIFICANT CHANGE UP (ref 1.8–7.4)
NEUTROPHILS NFR BLD AUTO: 74 % — SIGNIFICANT CHANGE UP (ref 43–77)
NEUTROPHILS NFR BLD AUTO: 74 % — SIGNIFICANT CHANGE UP (ref 43–77)
NRBC # BLD: 0 /100 WBCS — SIGNIFICANT CHANGE UP (ref 0–0)
NRBC # BLD: 0 /100 WBCS — SIGNIFICANT CHANGE UP (ref 0–0)
NT-PROBNP SERPL-SCNC: 160 PG/ML — HIGH (ref 0–125)
NT-PROBNP SERPL-SCNC: 160 PG/ML — HIGH (ref 0–125)
PLATELET # BLD AUTO: 223 K/UL — SIGNIFICANT CHANGE UP (ref 150–400)
PLATELET # BLD AUTO: 223 K/UL — SIGNIFICANT CHANGE UP (ref 150–400)
POTASSIUM SERPL-MCNC: 4.1 MMOL/L — SIGNIFICANT CHANGE UP (ref 3.5–5.3)
POTASSIUM SERPL-MCNC: 4.1 MMOL/L — SIGNIFICANT CHANGE UP (ref 3.5–5.3)
POTASSIUM SERPL-SCNC: 4.1 MMOL/L — SIGNIFICANT CHANGE UP (ref 3.5–5.3)
POTASSIUM SERPL-SCNC: 4.1 MMOL/L — SIGNIFICANT CHANGE UP (ref 3.5–5.3)
PROT SERPL-MCNC: 7.7 G/DL — SIGNIFICANT CHANGE UP (ref 6–8.3)
PROT SERPL-MCNC: 7.7 G/DL — SIGNIFICANT CHANGE UP (ref 6–8.3)
RAPID RVP RESULT: DETECTED
RAPID RVP RESULT: DETECTED
RBC # BLD: 4.75 M/UL — SIGNIFICANT CHANGE UP (ref 3.8–5.2)
RBC # BLD: 4.75 M/UL — SIGNIFICANT CHANGE UP (ref 3.8–5.2)
RBC # FLD: 14 % — SIGNIFICANT CHANGE UP (ref 10.3–14.5)
RBC # FLD: 14 % — SIGNIFICANT CHANGE UP (ref 10.3–14.5)
RV+EV RNA SPEC QL NAA+PROBE: DETECTED
RV+EV RNA SPEC QL NAA+PROBE: DETECTED
SARS-COV-2 RNA SPEC QL NAA+PROBE: DETECTED
SARS-COV-2 RNA SPEC QL NAA+PROBE: DETECTED
SODIUM SERPL-SCNC: 139 MMOL/L — SIGNIFICANT CHANGE UP (ref 135–145)
SODIUM SERPL-SCNC: 139 MMOL/L — SIGNIFICANT CHANGE UP (ref 135–145)
TROPONIN I, HIGH SENSITIVITY RESULT: 5.9 NG/L — SIGNIFICANT CHANGE UP
TROPONIN I, HIGH SENSITIVITY RESULT: 5.9 NG/L — SIGNIFICANT CHANGE UP
WBC # BLD: 7.42 K/UL — SIGNIFICANT CHANGE UP (ref 3.8–10.5)
WBC # BLD: 7.42 K/UL — SIGNIFICANT CHANGE UP (ref 3.8–10.5)
WBC # FLD AUTO: 7.42 K/UL — SIGNIFICANT CHANGE UP (ref 3.8–10.5)
WBC # FLD AUTO: 7.42 K/UL — SIGNIFICANT CHANGE UP (ref 3.8–10.5)

## 2023-12-27 PROCEDURE — 99285 EMERGENCY DEPT VISIT HI MDM: CPT | Mod: 25

## 2023-12-27 PROCEDURE — 71275 CT ANGIOGRAPHY CHEST: CPT | Mod: 26,ME

## 2023-12-27 PROCEDURE — 80053 COMPREHEN METABOLIC PANEL: CPT

## 2023-12-27 PROCEDURE — 96361 HYDRATE IV INFUSION ADD-ON: CPT

## 2023-12-27 PROCEDURE — 71046 X-RAY EXAM CHEST 2 VIEWS: CPT

## 2023-12-27 PROCEDURE — 85025 COMPLETE CBC W/AUTO DIFF WBC: CPT

## 2023-12-27 PROCEDURE — 71275 CT ANGIOGRAPHY CHEST: CPT | Mod: ME

## 2023-12-27 PROCEDURE — 84484 ASSAY OF TROPONIN QUANT: CPT

## 2023-12-27 PROCEDURE — 0225U NFCT DS DNA&RNA 21 SARSCOV2: CPT

## 2023-12-27 PROCEDURE — G1004: CPT

## 2023-12-27 PROCEDURE — 82550 ASSAY OF CK (CPK): CPT

## 2023-12-27 PROCEDURE — 99285 EMERGENCY DEPT VISIT HI MDM: CPT

## 2023-12-27 PROCEDURE — 94640 AIRWAY INHALATION TREATMENT: CPT

## 2023-12-27 PROCEDURE — 36415 COLL VENOUS BLD VENIPUNCTURE: CPT

## 2023-12-27 PROCEDURE — 71046 X-RAY EXAM CHEST 2 VIEWS: CPT | Mod: 26

## 2023-12-27 PROCEDURE — 93005 ELECTROCARDIOGRAM TRACING: CPT

## 2023-12-27 PROCEDURE — 93010 ELECTROCARDIOGRAM REPORT: CPT

## 2023-12-27 PROCEDURE — 96374 THER/PROPH/DIAG INJ IV PUSH: CPT | Mod: XU

## 2023-12-27 PROCEDURE — 83880 ASSAY OF NATRIURETIC PEPTIDE: CPT

## 2023-12-27 PROCEDURE — 85379 FIBRIN DEGRADATION QUANT: CPT

## 2023-12-27 RX ORDER — SODIUM CHLORIDE 9 MG/ML
1000 INJECTION INTRAMUSCULAR; INTRAVENOUS; SUBCUTANEOUS ONCE
Refills: 0 | Status: COMPLETED | OUTPATIENT
Start: 2023-12-27 | End: 2023-12-27

## 2023-12-27 RX ORDER — ALBUTEROL 90 UG/1
2 AEROSOL, METERED ORAL ONCE
Refills: 0 | Status: COMPLETED | OUTPATIENT
Start: 2023-12-27 | End: 2023-12-27

## 2023-12-27 RX ORDER — AZITHROMYCIN 500 MG/1
500 TABLET, FILM COATED ORAL ONCE
Refills: 0 | Status: COMPLETED | OUTPATIENT
Start: 2023-12-27 | End: 2023-12-27

## 2023-12-27 RX ORDER — AZITHROMYCIN 500 MG/1
1 TABLET, FILM COATED ORAL
Qty: 4 | Refills: 0
Start: 2023-12-27 | End: 2023-12-30

## 2023-12-27 RX ADMIN — AZITHROMYCIN 500 MILLIGRAM(S): 500 TABLET, FILM COATED ORAL at 11:26

## 2023-12-27 RX ADMIN — SODIUM CHLORIDE 1000 MILLILITER(S): 9 INJECTION INTRAMUSCULAR; INTRAVENOUS; SUBCUTANEOUS at 10:56

## 2023-12-27 RX ADMIN — Medication 125 MILLIGRAM(S): at 08:37

## 2023-12-27 RX ADMIN — SODIUM CHLORIDE 1000 MILLILITER(S): 9 INJECTION INTRAMUSCULAR; INTRAVENOUS; SUBCUTANEOUS at 08:37

## 2023-12-27 RX ADMIN — ALBUTEROL 2 PUFF(S): 90 AEROSOL, METERED ORAL at 09:01

## 2023-12-27 NOTE — ED PROVIDER NOTE - WR INTERPRETATION 1
Anemia  iron def anemia  iron transfusions started end of feb and transfused x 1 unit 3/5  Fibroids    History of termination of pregnancy  2014  
nad

## 2023-12-27 NOTE — ED PROVIDER NOTE - PROVIDER TOKENS
PROVIDER:[TOKEN:[[id number]]],PROVIDER:[TOKEN:[[id number]]] PROVIDER:[TOKEN:[[id number]]],PROVIDER:[TOKEN:[[id number]]] PROVIDER:[TOKEN:[[id number]]],PROVIDER:[TOKEN:[[id number]]] PROVIDER:[TOKEN:[[id number]]],PROVIDER:[TOKEN:[[id number]]]

## 2023-12-27 NOTE — ED ADULT NURSE NOTE - CAS TRG GEN SKIN CONDITION
patient with cholecystitis s/p biliary drain dislodgement, now with elevated wbc, gb protruding through abdominal wall, will admit for iv abx and further management Warm

## 2023-12-27 NOTE — ED ADULT NURSE NOTE - NSFALLUNIVINTERV_ED_ALL_ED
Bed/Stretcher in lowest position, wheels locked, appropriate side rails in place/Call bell, personal items and telephone in reach/Instruct patient to call for assistance before getting out of bed/chair/stretcher/Non-slip footwear applied when patient is off stretcher/[place] to call system/Physically safe environment - no spills, clutter or unnecessary equipment/Purposeful proactive rounding/Room/bathroom lighting operational, light cord in reach Bed/Stretcher in lowest position, wheels locked, appropriate side rails in place/Call bell, personal items and telephone in reach/Instruct patient to call for assistance before getting out of bed/chair/stretcher/Non-slip footwear applied when patient is off stretcher/[place] to call system/Physically safe environment - no spills, clutter or unnecessary equipment/Purposeful proactive rounding/Room/bathroom lighting operational, light cord in reach

## 2023-12-27 NOTE — ED ADULT TRIAGE NOTE - CHIEF COMPLAINT QUOTE
Patient comes in with cough, and fever x2 days. Patient endorses chest pain and upper back pain. Patient states she works in . Patient denies sob.

## 2023-12-27 NOTE — ED PROVIDER NOTE - CARE PROVIDERS DIRECT ADDRESSES
,[email or web address] ,[email or web address],DirectAddress_Unknown ,[email or web address],DirectAddress_Unknown

## 2023-12-27 NOTE — ED PROVIDER NOTE - OBJECTIVE STATEMENT
48-year-old female with history of GERD presents with cough, nasal congestion, URI symptoms which have been persistent over the past 3 weeks.  Patient was seen by pulmonology last week, with no acute additional changes.  Patient antibiotics.  Patient with persistent symptoms, no acute shortness of breath.  Patient with some heaviness with her chest, with coughing.  No exertional chest pain.  Pain is not positional.  No recent travel or immobilization.  No aggravating or alleviating factors otherwise noted.  No other acute injury or complaints.  Patient previously vaccinated for COVID, last year.  Patient vaccinated for flu this year.

## 2023-12-27 NOTE — ED PROVIDER NOTE - PATIENT PORTAL LINK FT
You can access the FollowMyHealth Patient Portal offered by [facility] by registering at the following website: [email or web address]. By joining [facility]’s FollowMyHealth portal, you will also be able to view your health information using other applications (apps) compatible with our system. You can access the FollowMyHealth Patient Portal offered by [facility] by registering at the following website: [email or web address]. By joining [employer]’s FollowMyHealth portal, you will also be able to view your health information using other applications (apps) compatible with our system.

## 2023-12-27 NOTE — ED PROVIDER NOTE - NPI NUMBER (FOR SYSADMIN USE ONLY) :
[[id number]] [[id number]],[[id number] [[id number]],[[id number]] [[id number]],[[id number]]

## 2023-12-27 NOTE — ED ADULT NURSE REASSESSMENT NOTE - TEMPERATURE IN FAHRENHEIT (DEGREES F)
From: Yash William  To: Milton Flaherty  Sent: 4/5/2022 3:21 PM CDT  Subject: Amitriptyline refill    So after playing with the dosage of this for a couple months (I hope that was ok, you said you were starting me off with a middle of the road dose of 50mg/day) I have come to the conclusion that 100 mg/day works the best. Is that ok with you? If so can you please submit a new prescription to [facility] for me? I still need to get in for a physical, I haven’t forgotten. Just been dealing with a lot with Robina still. Thanks so much. -Fredis   98

## 2023-12-27 NOTE — ED PROVIDER NOTE - CLINICAL SUMMARY MEDICAL DECISION MAKING FREE TEXT BOX
Atypical chest pain with cough/URI over past 2 weeks.  Will check labs, dimer, x-ray, RVP, outpatient follow-up

## 2023-12-27 NOTE — ED ADULT NURSE NOTE - OBJECTIVE STATEMENT
48yr old female walked into ED c/o cough and congestion x3 weeks; pt saw pulmonology Friday and was advised to come to ED if cough persists; pt tested negative for COvid

## 2023-12-27 NOTE — ED ADULT NURSE REASSESSMENT NOTE - NS ED NURSE REASSESS COMMENT FT1
report from julio holcomb. pt resting on stretcher, pending CT at this time. pt given breathing treatment per md order with positive relief. pt states feeling better at this time. pt states cough, airway patent, speaking clearly at this time. pt has #22 to RAC patent, clean and dry. iv fluids infusing per md order. pt ambulates independently with steady gait. vss, no acute distress noted. fall and safety precautions maintained at all times. call bell within reach, will continue to monitor.

## 2023-12-27 NOTE — ED PROVIDER NOTE - NSFOLLOWUPINSTRUCTIONS_ED_ALL_ED_FT
1. Follow-up with your Primary Medical Doctor or referred doctor. Call today / next business day for prompt follow-up.  2. Return to Emergency room for any worsening or persistent pain, weakness, fever, dizziness, passing out, difficulty breathing or any other concerning symptoms.  3. See attached instruction sheets for additional information, including information regarding signs and symptoms to look out for, reasons to seek immediate care and other important instructions.  4.  Plenty of fluids  5. 1. Follow-up with your Primary Medical Doctor. Call today for prompt follow-up.  2. Return to Emergency room for any worsening or persistent pain, weakness, fever, dizziness, passing out, difficulty breathing or any other concerning symptoms.  3. See attached instruction sheets for additional information, including information regarding signs and symptoms to look out for, reasons to seek immediate care and other important instructions.  4.  Plenty of fluids  5. Follow-up with your pulmonologist this week as discussed/scheduled  at [facility]  6.  Zithromax once daily as prescribed  7.  Tessalon Perles as prescribed    1. Seguimiento con huddleston Médico de Atención Primaria. Llame hoy para un seguimiento inmediato.  2. Regrese a la sandra de emergencias si empeora o persiste el dolor, debilidad, fiebre, mareos, desmayos, dificultad para respirar o cualquier otro síntoma preocupante.  3. Consulte las hojas de instrucciones adjuntas para obtener información adicional, incluida información sobre los signos y síntomas a los que debe prestar atención, motivos para buscar atención inmediata y otras instrucciones importantes.  4. Muchos líquidos  5. Bhavin un seguimiento con huddleston neumólogo esta semana según lo discutido/programado a [facility]  6. Zithromax melissa vez al día según lo prescrito  7. Tessalon Perles según lo prescrito 1. Follow-up with your Primary Medical Doctor. Call today for prompt follow-up.  2. Return to Emergency room for any worsening or persistent pain, weakness, fever, dizziness, passing out, difficulty breathing or any other concerning symptoms.  3. See attached instruction sheets for additional information, including information regarding signs and symptoms to look out for, reasons to seek immediate care and other important instructions.  4.  Plenty of fluids  5. Follow-up with your pulmonologist this week as discussed/scheduled  at [facility]  6.  Zithromax once daily as prescribed  7.  Tessalon Perles as prescribed    1. Seguimiento con huddleston Médico de Atención Primaria. Llame hoy para un seguimiento inmediato.  2. Regrese a la sandra de emergencias si empeora o persiste el dolor, debilidad, fiebre, mareos, desmayos, dificultad para respirar o cualquier otro síntoma preocupante.  3. Consulte las hojas de instrucciones adjuntas para obtener información adicional, incluida información sobre los signos y síntomas a los que debe prestar atención, motivos para buscar atención inmediata y otras instrucciones importantes.  4. Muchos líquidos  5. Bhavin un seguimiento con huddleston neumólogo esta semana según lo discutido/programado a [facility]  6. Zithromax melissa vez al día según lo prescrito  7. Tessalon Perles según lo prescrito

## 2023-12-27 NOTE — ED PROVIDER NOTE - CONSIDERATION OF ADMISSION OBSERVATION
Patient Likely discharge for outpatient follow-up, pending workup Consideration of Admission/Observation

## 2023-12-27 NOTE — ED ADULT NURSE NOTE - ABDOMEN
How Severe Is Your Rosacea?: moderate Is This A New Presentation, Or A Follow-Up?: Rosacea soft/nondistended/obese

## 2023-12-27 NOTE — ED PROVIDER NOTE - PROGRESS NOTE DETAILS
patient doing well, feeling much improved.  Patient has an appointment on Friday again to see her pulmonologist.  Turn to the ER with any acute change or concerns, and otherwise will follow-up as outpatient.  Patient demonstrates understanding of all instructions and precautions.

## 2023-12-28 ENCOUNTER — EMERGENCY (EMERGENCY)
Facility: HOSPITAL | Age: 48
LOS: 1 days | Discharge: HOME | End: 2023-12-28
Attending: EMERGENCY MEDICINE | Admitting: EMERGENCY MEDICINE
Payer: COMMERCIAL

## 2023-12-28 VITALS
WEIGHT: 293 LBS | TEMPERATURE: 99 F | HEIGHT: 63 IN | HEART RATE: 92 BPM | RESPIRATION RATE: 20 BRPM | SYSTOLIC BLOOD PRESSURE: 136 MMHG | OXYGEN SATURATION: 95 % | DIASTOLIC BLOOD PRESSURE: 92 MMHG

## 2023-12-28 DIAGNOSIS — Z90.49 ACQUIRED ABSENCE OF OTHER SPECIFIED PARTS OF DIGESTIVE TRACT: Chronic | ICD-10-CM

## 2023-12-28 DIAGNOSIS — Z90.710 ACQUIRED ABSENCE OF BOTH CERVIX AND UTERUS: Chronic | ICD-10-CM

## 2023-12-28 DIAGNOSIS — Z98.51 TUBAL LIGATION STATUS: Chronic | ICD-10-CM

## 2023-12-28 DIAGNOSIS — Z98.891 HISTORY OF UTERINE SCAR FROM PREVIOUS SURGERY: Chronic | ICD-10-CM

## 2023-12-28 PROCEDURE — 99283 EMERGENCY DEPT VISIT LOW MDM: CPT

## 2023-12-28 RX ORDER — HYDROCODONE BITARTRATE AND HOMATROPINE METHYLBROMIDE 5; 1.5 MG/5ML; MG/5ML
5 SOLUTION ORAL
Qty: 100 | Refills: 0
Start: 2023-12-28

## 2023-12-28 RX ORDER — PANTOPRAZOLE SODIUM 20 MG/1
1 TABLET, DELAYED RELEASE ORAL
Qty: 0 | Refills: 0 | DISCHARGE

## 2023-12-28 RX ORDER — IPRATROPIUM/ALBUTEROL SULFATE 18-103MCG
3 AEROSOL WITH ADAPTER (GRAM) INHALATION ONCE
Refills: 0 | Status: DISCONTINUED | OUTPATIENT
Start: 2023-12-28 | End: 2024-01-01

## 2023-12-28 RX ORDER — ALBUTEROL 90 UG/1
2 AEROSOL, METERED ORAL
Qty: 0 | Refills: 0 | DISCHARGE

## 2023-12-28 RX ORDER — IBUPROFEN 200 MG
1 TABLET ORAL
Qty: 0 | Refills: 0 | DISCHARGE

## 2023-12-28 RX ORDER — AZELASTINE 137 UG/1
2 SPRAY, METERED NASAL
Qty: 0 | Refills: 0 | DISCHARGE

## 2023-12-28 NOTE — ED ADULT NURSE NOTE - CHIEF COMPLAINT QUOTE
cough green mucus and back pain and fever seen in ed last night for same. pt states was not told she has covid that she was told she has a cold

## 2023-12-28 NOTE — ED PROVIDER NOTE - OBJECTIVE STATEMENT
48-year-old female with a history of GERD presents with cough, nasal congestion, URI symptoms.  Patient is having symptoms over past 3 weeks, now with some increased cough and URI this week.  Patient was seen in the ED yesterday, had a workup including labs and CT angio chest.  This is all negative.  After the patient was discharged, her RVP resulted with COVID and entero-/rhinovirus.  Patient came to the ED today, as she has persistent symptoms.  No acute shortness of breath.  No acute chest pain.  No vomiting or diarrhea.  Patient has not yet been aware about the positive COVID test.  No aggravating or alleviating factors otherwise noted.  No other acute injury or complaints.  Patient not having any difficulty breathing at this time, no acute chest pain.

## 2023-12-28 NOTE — ED ADULT TRIAGE NOTE - ISOLATION INDICATION AIRBORNE CONTACT
[FreeTextEntry1] : Ms. 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 \par \par Testing review: Normal brain PET, MRI and EEG. LP from 12/2019 shows Protein: 27, Glucose: 53, Cells: 1, OGB: 0, PATI: 3.76 (<0.02). Further serology showed IL 1b: 11 (<6.7) and IL 10: 7 (<2.8). \par \par Treatment includes: IVMP: 1g x 5 8/20, PLEX x5 8/20, Rituxan #1: 8/25 ( stopped early due to side effects of  SOB and tachycardia, unsure if related as patient ate something containing sunflower seeds with a known allergy prior to infusion)and 9/11 (completed without side effects)and IVIG 10/1 at 2g/kg/month over 5 days. Of note, patient had poor toleration to the IVIG and developed migraines, GI upset and flu-like symptoms. IVIG was changed to 0.5g/kg weekly. Infusions remain over 6-7 hours with premed of Tylenol, Benadryl and 500CC NS pre and post infusions that began the week of 10/26. \par _____________________________________________________________\par \par \par Presents today for surgical clearance for laparoscopic removal of uterine fibroids with symptoms of pain and excessive bleeding. \par \par Since last seen patient had an outpatient psychiatric admission. Several medication adjustments were made. At this time patient is reporting that psychiatric symptoms are still present but are more stable. \par \par She continues to report symptoms of depression, daily migraines, being irrational, aggression, anger and agitation. \par \par Due to psych admission she has not followed up with Dr. Rodriguez as instructed for next plan of treatment with [facility]. Had stable MRI brain as reported below. NPT was consistent with symptoms of AE (full report in chart notes). \par \par She denies any new neurological complaints at this time. Other Specify

## 2023-12-28 NOTE — ED ADULT NURSE NOTE - OBJECTIVE STATEMENT
49 y/o female received axo4 ambulatory c/o persistent generalized body aches, malaise, fevers, cough. pt unaware that she had covid19.

## 2023-12-28 NOTE — ED PROVIDER NOTE - CLINICAL SUMMARY MEDICAL DECISION MAKING FREE TEXT BOX
Acute COVID infection, with some type cough.  Patient is not hypoxic, patient is well-appearing.  Will give albuterol neb x 1, outpatient follow-up.

## 2023-12-28 NOTE — ED PROVIDER NOTE - NSFOLLOWUPINSTRUCTIONS_ED_ALL_ED_FT
1. Follow-up with your Primary Medical Doctor or referred doctor. Call today / next business day for prompt follow-up.  2. Return to Emergency room for any worsening or persistent pain, weakness, fever, dizziness, passing out, difficulty breathing or any other concerning symptoms.  3. See attached instruction sheets for additional information, including information regarding signs and symptoms to look out for, reasons to seek immediate care and other important instructions.  4.  Plenty of fluids  5.  You have been diagnosed today with COVID 19 (Coronavirus).    You are being sent home at this time, but you need to put yourself on HOME ISOLATION.  It is currently recommended at this time that you isolate yourself until you are symptom free or you are told you can stop.  When home on home isolation please try to use your own bathroom and bedroom, in an effort to prevent the spread to anyone in your household.  Continue Tylenol per label instructions as needed for fever and body aches  Advance activity as tolerated  Please return to the ED for increased difficulty breathing or signs of respiratory distress, unable to eat / drink, dizziness , passing out, chest pains, or any other concerning symptoms. 1. Follow-up with your Primary Medical Doctor . Call today / next business day for prompt follow-up.  2. Return to Emergency room for any worsening or persistent pain, weakness, fever, dizziness, passing out, difficulty breathing or any other concerning symptoms.  3. See attached instruction sheets for additional information, including information regarding signs and symptoms to look out for, reasons to seek immediate care and other important instructions.  4.  Plenty of fluids  5.  You have been diagnosed today with COVID 19 (Coronavirus).    You are being sent home at this time, but you need to put yourself on HOME ISOLATION.  It is currently recommended at this time that you isolate yourself until you are symptom free or you are told you can stop.  When home on home isolation please try to use your own bathroom and bedroom, in an effort to prevent the spread to anyone in your household.  Continue Tylenol per label instructions as needed for fever and body aches  Advance activity as tolerated  Please return to the ED for increased difficulty breathing or signs of respiratory distress, unable to eat / drink, dizziness , passing out, chest pains, or any other concerning symptoms.  6.  Hycodan as needed for cough  7.  Continue the albuterol as needed    1. Seguimiento con huddleston Médico Primario. Llame hoy o el siguiente día hábil para realizar un seguimiento inmediato.  2. Regrese a la sandra de emergencias si empeora o persiste el dolor, debilidad, fiebre, mareos, desmayos, dificultad para respirar o cualquier otro síntoma preocupante.  3. Consulte las hojas de instrucciones adjuntas para obtener información adicional, incluida información sobre los signos y síntomas a los que debe prestar atención, motivos para buscar atención inmediata y otras instrucciones importantes.  4. Muchos líquidos  5. Hoy le duke diagnosticado COVID 19 (Coronavirus). Lo enviarán a casa en dagmar momento, phillip debe ponerse en AISLAMIENTO EN CASA. Actualmente se recomienda en dagmar momento que se aísle hasta que no tenga síntomas o hasta que le indiquen que puede dejar de hacerlo.  Cuando esté en aislamiento domiciliario, intente usar huddleston propio baño y dormitorio, en un esfuerzo por evitar la propagación a cualquier persona en huddleston hogar.  Continúe con Tylenol según las instrucciones de la etiqueta según sea necesario para la fiebre y los yamile corporales.  Actividad avanzada según se tolere  Regrese al servicio de urgencias si presenta mayor dificultad para respirar o signos de dificultad respiratoria, incapacidad para comer o beber, mareos, desmayos, yamile en el pecho o cualquier otro síntoma preocupante.  6. Hycodan según sea necesario para la tos  7. Continúe con el albuterol según sea necesario. 1. Follow-up with your Primary Medical Doctor . Call today / next business day for prompt follow-up.  2. Return to Emergency room for any worsening or persistent pain, weakness, fever, dizziness, passing out, difficulty breathing or any other concerning symptoms.  3. See attached instruction sheets for additional information, including information regarding signs and symptoms to look out for, reasons to seek immediate care and other important instructions.  4.  Plenty of fluids  5.  You have been diagnosed today with COVID 19 (Coronavirus).    You are being sent home at this time, but you need to put yourself on HOME ISOLATION.  It is currently recommended at this time that you isolate yourself until you are symptom free or you are told you can stop.  When home on home isolation please try to use your own bathroom and bedroom, in an effort to prevent the spread to anyone in your household.  Continue Tylenol per label instructions as needed for fever and body aches  Advance activity as tolerated  Please return to the ED for increased difficulty breathing or signs of respiratory distress, unable to eat / drink, dizziness , passing out, chest pains, or any other concerning symptoms.  6.  Hycodan as needed for cough  7.  Continue the albuterol as needed    1. Seguimiento con huddlestno Médico Primario. Llame hoy o el siguiente día hábil para realizar un seguimiento inmediato.  2. Regrese a la sandra de emergencias si empeora o persiste el dolor, debilidad, fiebre, mareos, desmayos, dificultad para respirar o cualquier otro síntoma preocupante.  3. Consulte las hojas de instrucciones adjuntas para obtener información adicional, incluida información sobre los signos y síntomas a los que debe prestar atención, motivos para buscar atención inmediata y otras instrucciones importantes.  4. Muchos líquidos  5. Hoy le duke diagnosticado COVID 19 (Coronavirus). Lo enviarán a casa en dagmar momento, phillip debe ponerse en AISLAMIENTO EN CASA. Actualmente se recomienda en dagmar momento que se aísle hasta que no tenga síntomas o hasta que le indiquen que puede dejar de hacerlo.  Cuando esté en aislamiento domiciliario, intente usar huddleston propio baño y dormitorio, en un esfuerzo por evitar la propagación a cualquier persona en huddleston hogar.  Continúe con Tylenol según las instrucciones de la etiqueta según sea necesario para la fiebre y los yamile corporales.  Actividad avanzada según se tolere  Regrese al servicio de urgencias si presenta mayor dificultad para respirar o signos de dificultad respiratoria, incapacidad para comer o beber, mareos, desmayos, yamile en el pecho o cualquier otro síntoma preocupante.  6. Hycodan según sea necesario para la tos  7. Continúe con el albuterol según sea necesario.

## 2023-12-28 NOTE — ED PROVIDER NOTE - IV ALTEPLASE DOOR HIDDEN
Can you please refill prescription? Montserrat Ma is out sick    Medication:   Requested Prescriptions     Pending Prescriptions Disp Refills    lisdexamfetamine (VYVANSE) 70 MG capsule 30 capsule 0     Sig: Take 1 capsule by mouth every morning for 30 days.  amphetamine-dextroamphetamine (ADDERALL, 20MG,) 20 MG tablet 30 tablet 0     Sig: Take 1 tablet by mouth daily for 30 days. Last Filled:      Patient Phone Number: [phone] (home)     Last appt: 11/5/2021   Next appt: Visit date not found    Last OARRS:   RX Monitoring 11/5/2021   Attestation -   Periodic Controlled Substance Monitoring No signs of potential drug abuse or diversion identified. show

## 2023-12-28 NOTE — ED PROVIDER NOTE - PATIENT PORTAL LINK FT
You can access the FollowMyHealth Patient Portal offered by [facility] by registering at the following website: [email or web address]. By joining [employer]’s FollowMyHealth portal, you will also be able to view your health information using other applications (apps) compatible with our system. You can access the FollowMyHealth Patient Portal offered by [facility] by registering at the following website: [email or web address]. By joining [employer]’s FollowMyHealth portal, you will also be able to view your health information using other applications (apps) compatible with our system.

## 2023-12-28 NOTE — ED PROVIDER NOTE - PROGRESS NOTE DETAILS
Discussed with patient regarding infection precaution instructions, importance of close prompt follow-up with her doctor, to return with any acute changes or concerns.

## 2023-12-28 NOTE — ED PROVIDER NOTE - RESPIRATORY, MLM
Breath sounds clear and equal bilaterally. nl resp effort. no w/r/r. no acc muscle use. slight tight cough.

## 2023-12-28 NOTE — ED ADULT NURSE NOTE - NSFALLUNIVINTERV_ED_ALL_ED
Bed/Stretcher in lowest position, wheels locked, appropriate side rails in place/Call bell, personal items and telephone in reach/Instruct patient to call for assistance before getting out of bed/chair/stretcher/Non-slip footwear applied when patient is off stretcher/[place] to call system/Physically safe environment - no spills, clutter or unnecessary equipment/Purposeful proactive rounding/Room/bathroom lighting operational, light cord in reach Bed/Stretcher in lowest position, wheels locked, appropriate side rails in place/Call bell, personal items and telephone in reach/Instruct patient to call for assistance before getting out of bed/chair/stretcher/Non-slip footwear applied when patient is off stretcher/[place] to call system/Physically safe environment - no spills, clutter or unnecessary equipment/Purposeful proactive rounding/Room/bathroom lighting operational, light cord in reach

## 2024-05-20 NOTE — ED ADULT NURSE NOTE - BREATHING
8.0    4.13  )-----------( 128      ( 20 May 2024 10:53 )             25.2     142  |  107  |  78<H>  ----------------------------<  131<H>     05-20  3.5   |  20<L>  |  3.71<H>    Ca    8.2<L>      20 May 2024 10:53    TPro  6.2  /  Alb  2.9<L>  /  TBili  0.8  /  DBili  x   /  AST  71<H>  /  ALT  53<H>  /  AlkPhos  137<H>  05-20    PT/INR: 17.7/1.59 (05-20-24 @ 10:53)  PTT: 42.3 (05-20-24 @ 10:53)      10:53 - VBG - pH: 7.50  | pCO2: 28    | pO2: 135   | Lactate: 1.6 spontaneous

## 2025-01-03 ENCOUNTER — INPATIENT (INPATIENT)
Facility: HOSPITAL | Age: 50
LOS: 2 days | Discharge: HOME | DRG: 153 | End: 2025-01-06
Attending: HOSPITALIST | Admitting: HOSPITALIST
Payer: COMMERCIAL

## 2025-01-03 DIAGNOSIS — Z90.49 ACQUIRED ABSENCE OF OTHER SPECIFIED PARTS OF DIGESTIVE TRACT: Chronic | ICD-10-CM

## 2025-01-03 DIAGNOSIS — Z90.710 ACQUIRED ABSENCE OF BOTH CERVIX AND UTERUS: Chronic | ICD-10-CM

## 2025-01-03 DIAGNOSIS — Z98.891 HISTORY OF UTERINE SCAR FROM PREVIOUS SURGERY: Chronic | ICD-10-CM

## 2025-01-03 DIAGNOSIS — Z98.51 TUBAL LIGATION STATUS: Chronic | ICD-10-CM

## 2025-01-03 PROCEDURE — 99285 EMERGENCY DEPT VISIT HI MDM: CPT

## 2025-01-04 VITALS
WEIGHT: 293 LBS | HEIGHT: 63 IN | RESPIRATION RATE: 17 BRPM | TEMPERATURE: 98 F | DIASTOLIC BLOOD PRESSURE: 80 MMHG | HEART RATE: 81 BPM | SYSTOLIC BLOOD PRESSURE: 133 MMHG | OXYGEN SATURATION: 98 %

## 2025-01-04 DIAGNOSIS — J06.9 ACUTE UPPER RESPIRATORY INFECTION, UNSPECIFIED: ICD-10-CM

## 2025-01-04 LAB
ALBUMIN SERPL ELPH-MCNC: 3.5 G/DL — SIGNIFICANT CHANGE UP (ref 3.3–5)
ALP SERPL-CCNC: 97 U/L — SIGNIFICANT CHANGE UP (ref 30–120)
ALT FLD-CCNC: 67 U/L — HIGH (ref 10–60)
ANION GAP SERPL CALC-SCNC: 10 MMOL/L — SIGNIFICANT CHANGE UP (ref 5–17)
ANISOCYTOSIS BLD QL: SLIGHT — SIGNIFICANT CHANGE UP
AST SERPL-CCNC: 30 U/L — SIGNIFICANT CHANGE UP (ref 10–40)
BASOPHILS # BLD AUTO: 0.13 K/UL — SIGNIFICANT CHANGE UP (ref 0–0.2)
BASOPHILS NFR BLD AUTO: 1 % — SIGNIFICANT CHANGE UP (ref 0–2)
BILIRUB SERPL-MCNC: 0.2 MG/DL — SIGNIFICANT CHANGE UP (ref 0.2–1.2)
BUN SERPL-MCNC: 11 MG/DL — SIGNIFICANT CHANGE UP (ref 7–23)
CALCIUM SERPL-MCNC: 8.9 MG/DL — SIGNIFICANT CHANGE UP (ref 8.4–10.5)
CHLORIDE SERPL-SCNC: 104 MMOL/L — SIGNIFICANT CHANGE UP (ref 96–108)
CO2 SERPL-SCNC: 25 MMOL/L — SIGNIFICANT CHANGE UP (ref 22–31)
CREAT SERPL-MCNC: 0.69 MG/DL — SIGNIFICANT CHANGE UP (ref 0.5–1.3)
EGFR: 108 ML/MIN/1.73M2 — SIGNIFICANT CHANGE UP
EOSINOPHIL # BLD AUTO: 0 K/UL — SIGNIFICANT CHANGE UP (ref 0–0.5)
EOSINOPHIL NFR BLD AUTO: 0 % — SIGNIFICANT CHANGE UP (ref 0–6)
FLUAV AG NPH QL: SIGNIFICANT CHANGE UP
FLUBV AG NPH QL: SIGNIFICANT CHANGE UP
GLUCOSE SERPL-MCNC: 154 MG/DL — HIGH (ref 70–99)
HCT VFR BLD CALC: 35.2 % — SIGNIFICANT CHANGE UP (ref 34.5–45)
HGB BLD-MCNC: 11 G/DL — LOW (ref 11.5–15.5)
LYMPHOCYTES # BLD AUTO: 2.9 K/UL — SIGNIFICANT CHANGE UP (ref 1–3.3)
LYMPHOCYTES # BLD AUTO: 23 % — SIGNIFICANT CHANGE UP (ref 13–44)
MANUAL SMEAR VERIFICATION: SIGNIFICANT CHANGE UP
MCHC RBC-ENTMCNC: 28.7 PG — SIGNIFICANT CHANGE UP (ref 27–34)
MCHC RBC-ENTMCNC: 31.3 G/DL — LOW (ref 32–36)
MCV RBC AUTO: 91.9 FL — SIGNIFICANT CHANGE UP (ref 80–100)
MONOCYTES # BLD AUTO: 1.01 K/UL — HIGH (ref 0–0.9)
MONOCYTES NFR BLD AUTO: 8 % — SIGNIFICANT CHANGE UP (ref 2–14)
MYELOCYTES NFR BLD: 3 % — HIGH (ref 0–0)
NEUTROPHILS # BLD AUTO: 8.2 K/UL — HIGH (ref 1.8–7.4)
NEUTROPHILS NFR BLD AUTO: 64 % — SIGNIFICANT CHANGE UP (ref 43–77)
NEUTS BAND # BLD: 1 % — SIGNIFICANT CHANGE UP (ref 0–8)
NRBC # BLD: 1 /100 WBCS — HIGH (ref 0–0)
NRBC # BLD: SIGNIFICANT CHANGE UP /100 WBCS (ref 0–0)
PLAT MORPH BLD: NORMAL — SIGNIFICANT CHANGE UP
PLATELET # BLD AUTO: 195 K/UL — SIGNIFICANT CHANGE UP (ref 150–400)
POLYCHROMASIA BLD QL SMEAR: SLIGHT — SIGNIFICANT CHANGE UP
POTASSIUM SERPL-MCNC: 4 MMOL/L — SIGNIFICANT CHANGE UP (ref 3.5–5.3)
POTASSIUM SERPL-SCNC: 4 MMOL/L — SIGNIFICANT CHANGE UP (ref 3.5–5.3)
PROT SERPL-MCNC: 6.9 G/DL — SIGNIFICANT CHANGE UP (ref 6–8.3)
RBC # BLD: 3.83 M/UL — SIGNIFICANT CHANGE UP (ref 3.8–5.2)
RBC # FLD: 17.7 % — HIGH (ref 10.3–14.5)
RBC BLD AUTO: SIGNIFICANT CHANGE UP
RSV RNA NPH QL NAA+NON-PROBE: DETECTED
SARS-COV-2 RNA SPEC QL NAA+PROBE: SIGNIFICANT CHANGE UP
SODIUM SERPL-SCNC: 139 MMOL/L — SIGNIFICANT CHANGE UP (ref 135–145)
WBC # BLD: 12.61 K/UL — HIGH (ref 3.8–10.5)
WBC # FLD AUTO: 12.61 K/UL — HIGH (ref 3.8–10.5)

## 2025-01-04 PROCEDURE — 71250 CT THORAX DX C-: CPT | Mod: 26,MC

## 2025-01-04 PROCEDURE — 99223 1ST HOSP IP/OBS HIGH 75: CPT

## 2025-01-04 PROCEDURE — 93010 ELECTROCARDIOGRAM REPORT: CPT

## 2025-01-04 PROCEDURE — 71045 X-RAY EXAM CHEST 1 VIEW: CPT | Mod: 26

## 2025-01-04 RX ORDER — ENOXAPARIN SODIUM 60 MG/.6ML
40 INJECTION INTRAVENOUS; SUBCUTANEOUS EVERY 12 HOURS
Refills: 0 | Status: DISCONTINUED | OUTPATIENT
Start: 2025-01-04 | End: 2025-01-06

## 2025-01-04 RX ORDER — ONDANSETRON 4 MG/1
4 TABLET ORAL ONCE
Refills: 0 | Status: COMPLETED | OUTPATIENT
Start: 2025-01-04 | End: 2025-01-04

## 2025-01-04 RX ORDER — MAG HYDROX/ALUMINUM HYD/SIMETH 200-200-20
30 SUSPENSION, ORAL (FINAL DOSE FORM) ORAL EVERY 4 HOURS
Refills: 0 | Status: DISCONTINUED | OUTPATIENT
Start: 2025-01-04 | End: 2025-01-06

## 2025-01-04 RX ORDER — MORPHINE SULFATE 15 MG
4 TABLET, EXTENDED RELEASE ORAL ONCE
Refills: 0 | Status: DISCONTINUED | OUTPATIENT
Start: 2025-01-04 | End: 2025-01-04

## 2025-01-04 RX ORDER — ONDANSETRON 4 MG/1
4 TABLET ORAL EVERY 8 HOURS
Refills: 0 | Status: DISCONTINUED | OUTPATIENT
Start: 2025-01-04 | End: 2025-01-06

## 2025-01-04 RX ORDER — BENZONATATE 100 MG
200 CAPSULE ORAL THREE TIMES A DAY
Refills: 0 | Status: DISCONTINUED | OUTPATIENT
Start: 2025-01-04 | End: 2025-01-05

## 2025-01-04 RX ORDER — GUAIFENESIN 100 MG/5ML
600 SYRUP ORAL EVERY 12 HOURS
Refills: 0 | Status: DISCONTINUED | OUTPATIENT
Start: 2025-01-04 | End: 2025-01-05

## 2025-01-04 RX ORDER — ACETAMINOPHEN 80 MG/.8ML
650 SOLUTION/ DROPS ORAL EVERY 6 HOURS
Refills: 0 | Status: DISCONTINUED | OUTPATIENT
Start: 2025-01-04 | End: 2025-01-06

## 2025-01-04 RX ORDER — GINKGO BILOBA 40 MG
3 CAPSULE ORAL AT BEDTIME
Refills: 0 | Status: DISCONTINUED | OUTPATIENT
Start: 2025-01-04 | End: 2025-01-06

## 2025-01-04 RX ADMIN — ACETAMINOPHEN 650 MILLIGRAM(S): 80 SOLUTION/ DROPS ORAL at 21:26

## 2025-01-04 RX ADMIN — ONDANSETRON 4 MILLIGRAM(S): 4 TABLET ORAL at 05:50

## 2025-01-04 RX ADMIN — Medication 4 MILLIGRAM(S): at 05:50

## 2025-01-04 RX ADMIN — Medication 200 MILLIGRAM(S): at 20:58

## 2025-01-04 RX ADMIN — ACETAMINOPHEN 650 MILLIGRAM(S): 80 SOLUTION/ DROPS ORAL at 20:56

## 2025-01-04 RX ADMIN — Medication 600 MILLIGRAM(S): at 13:22

## 2025-01-04 RX ADMIN — ENOXAPARIN SODIUM 40 MILLIGRAM(S): 60 INJECTION INTRAVENOUS; SUBCUTANEOUS at 17:47

## 2025-01-04 NOTE — ED PROVIDER NOTE - RESPIRATORY, MLM
Breath sounds clear and equal bilaterally. No wheezing, no rhonchi, no rales. +Productive cough, no accessory muscle use

## 2025-01-04 NOTE — ED ADULT NURSE NOTE - CHPI ED NUR RELIEVING FX
What Type Of Note Output Would You Prefer (Optional)?: Bullet Format
What Is The Reason For Today's Visit?: Full Body Skin Examination
What Is The Reason For Today's Visit? (Being Monitored For X): the re-examination of lesions previously examined
none

## 2025-01-04 NOTE — H&P ADULT - NSHPPHYSICALEXAM_GEN_ALL_CORE
GENERAL: NAD, well-groomed, well-developed  HEAD:  Atraumatic, Normocephalic  EYES: EOMI, PERRLA, conjunctiva and sclera clear  ENMT: No tonsillar erythema, exudates, or enlargement; Moist mucous membranes  NECK: Supple, No JVD, Normal thyroid  CHEST/LUNG: Clear to percussion bilaterally; No rales, rhonchi, wheezing, or rubs  HEART: Regular rate and rhythm; No murmurs, rubs, or gallops  ABDOMEN: Soft, Nontender, Nondistended; Bowel sounds present  EXTREMITIES:  2+ Peripheral Pulses, No clubbing, cyanosis, or edema  NERVOUS SYSTEM:  Alert & Oriented X3, Good concentration; Motor Strength 5/5 B/L upper and lower extremities; DTRs 2+ intact and symmetric  SKIN: No rashes or lesions

## 2025-01-04 NOTE — H&P ADULT - ASSESSMENT
49-year-old female with a history of lung CA, HLD, HTN, cervical CA, kidney stones, morbid obesity presents with back pain, chest pain, cough.     Admitted for RSV with hx of active malignancy  gmf  monitor for respiratory distress  f/u CT chest    DVT proph  lovenox

## 2025-01-04 NOTE — ED ADULT TRIAGE NOTE - NS ED TRIAGE AVPU SCALE
Alert-The patient is alert, awake and responds to voice. The patient is oriented to time, place, and person. The triage nurse is able to obtain subjective information.
EKG: SR no acute ST-T changes

## 2025-01-04 NOTE — ED ADULT NURSE NOTE - NSFALLRISK_ED_ALL_ED
C/o right foot pain, redness and swelling as well as ankle redness/blistering over the past couple of days. No fever, chills, or injury.
Department of Anesthesiology  Postprocedure Note    Patient: Serena Olivares  MRN: [id number]  YOB: 1969  Date of evaluation: 4/19/2023      Procedure Summary     Date: 04/19/23 Room / Location: [facility] OR 03 / [facility]    Anesthesia Start: 8810 Anesthesia Stop: 0846    Procedure: RIGHT KNEE ARTHROSCOPY MAJOR SYNOVECTOMY,  LATERAL MENISECTOMY (Right: Knee) Diagnosis:       Degenerative tear of lateral meniscus of right knee      Effusion of right knee      Chronic pain of right knee      (Degenerative tear of lateral meniscus of right knee [M23.300])      (Effusion of right knee [M25.461])      (Chronic pain of right knee [M25.561, G89.29])    Surgeons: Keila Lindo MD Responsible Provider: Jyothi Garza MD    Anesthesia Type: general ASA Status: 2          Anesthesia Type: No value filed.     Cameron Phase I: Caemron Score: 10    Cameron Phase II: Cameron Score: 10      Anesthesia Post Evaluation    Patient location during evaluation: PACU  Patient participation: complete - patient participated  Level of consciousness: sleepy but conscious  Airway patency: patent  Nausea & Vomiting: no nausea and no vomiting  Complications: no  Cardiovascular status: hemodynamically stable  Respiratory status: acceptable, nonlabored ventilation and spontaneous ventilation  Hydration status: euvolemic  Comments: /71   Pulse 66   Temp 98 °F (36.7 °C) (Temporal)   Resp 15   Ht 5' 3\" (1.6 m)   Wt 176 lb (79.8 kg)   SpO2 98%   BMI 31.18 kg/m²     Multimodal analgesia pain management approach
No

## 2025-01-04 NOTE — ED PROVIDER NOTE - CLINICAL SUMMARY MEDICAL DECISION MAKING FREE TEXT BOX
49 year old female with a history of lung CA on chemotherapy p/w cough x 1 week.  Patient reports productive cough associated with chest and back pain while coughing and subjective fevers.  Family members at home with similar symptoms.  Check labs, CXR, viral swab, abx/nebs as needed, consider admission as patient is immune-compromised on chemo with lung CA.

## 2025-01-04 NOTE — H&P ADULT - HISTORY OF PRESENT ILLNESS
49-year-old female with a history of lung CA, HLD, HTN, cervical CA, kidney stones, morbid obesity presents with back pain, chest pain, cough.  Patient states that she developed a productive cough 1 week ago.  She states when she coughs she has midsternal chest pain that radiates to her back.  She also reports subjective fevers at home for which she has been taking Tylenol.  Patient is on chemotherapy for lung CA currently on her fourth cycle and it was last given approximately 10 days ago.  Patient also reports family members at home who have been sick with upper respiratory infections.  PMD Rashid Luu                          11.0   12.61 )-----------( 195      ( 04 Jan 2025 05:15 )             35.2   01-04    139  |  104  |  11  ----------------------------<  154[H]  4.0   |  25  |  0.69    Ca    8.9      04 Jan 2025 05:15    TPro  6.9  /  Alb  3.5  /  TBili  0.2  /  DBili  x   /  AST  30  /  ALT  67[H]  /  AlkPhos  97  01-04

## 2025-01-04 NOTE — ED PROVIDER NOTE - OBJECTIVE STATEMENT
49-year-old female with a history of lung CA, HLD, HTN, cervical CA, kidney stones, morbid obesity presents with back pain, chest pain, cough.  Patient states that she developed a productive cough 1 week ago.  She states when she coughs she has midsternal chest pain that radiates to her back.  She also reports subjective fevers at home for which she has been taking Tylenol.  Patient is on chemotherapy for lung CA currently on her fourth cycle and it was last given approximately 10 days ago.  Patient also reports family members at home who have been sick with upper respiratory infections.  PMD Rashid Luu

## 2025-01-04 NOTE — ED PROVIDER NOTE - DIFFERENTIAL DIAGNOSIS
Ddx includes but not limited to PNA, asthma, COPD, bronchitis, URI, flu/covid/rsv, viral illness, pleural effusion, CHF Differential Diagnosis

## 2025-01-04 NOTE — ED PROVIDER NOTE - CONSTITUTIONAL, MLM
normal... Well appearing, awake, alert, oriented to person, place, time/situation and patient appears uncomfortable. Obese

## 2025-01-05 LAB
ALBUMIN SERPL ELPH-MCNC: 3.3 G/DL — SIGNIFICANT CHANGE UP (ref 3.3–5)
ALP SERPL-CCNC: 92 U/L — SIGNIFICANT CHANGE UP (ref 30–120)
ALT FLD-CCNC: 56 U/L — SIGNIFICANT CHANGE UP (ref 10–60)
ANION GAP SERPL CALC-SCNC: 6 MMOL/L — SIGNIFICANT CHANGE UP (ref 5–17)
AST SERPL-CCNC: 19 U/L — SIGNIFICANT CHANGE UP (ref 10–40)
BILIRUB SERPL-MCNC: 0.3 MG/DL — SIGNIFICANT CHANGE UP (ref 0.2–1.2)
BUN SERPL-MCNC: 10 MG/DL — SIGNIFICANT CHANGE UP (ref 7–23)
CALCIUM SERPL-MCNC: 8.6 MG/DL — SIGNIFICANT CHANGE UP (ref 8.4–10.5)
CHLORIDE SERPL-SCNC: 105 MMOL/L — SIGNIFICANT CHANGE UP (ref 96–108)
CO2 SERPL-SCNC: 29 MMOL/L — SIGNIFICANT CHANGE UP (ref 22–31)
CREAT SERPL-MCNC: 0.56 MG/DL — SIGNIFICANT CHANGE UP (ref 0.5–1.3)
EGFR: 113 ML/MIN/1.73M2 — SIGNIFICANT CHANGE UP
GLUCOSE SERPL-MCNC: 126 MG/DL — HIGH (ref 70–99)
HCT VFR BLD CALC: 36 % — SIGNIFICANT CHANGE UP (ref 34.5–45)
HGB BLD-MCNC: 11.5 G/DL — SIGNIFICANT CHANGE UP (ref 11.5–15.5)
MCHC RBC-ENTMCNC: 29 PG — SIGNIFICANT CHANGE UP (ref 27–34)
MCHC RBC-ENTMCNC: 31.9 G/DL — LOW (ref 32–36)
MCV RBC AUTO: 90.7 FL — SIGNIFICANT CHANGE UP (ref 80–100)
NRBC # BLD: 0 /100 WBCS — SIGNIFICANT CHANGE UP (ref 0–0)
PLATELET # BLD AUTO: 243 K/UL — SIGNIFICANT CHANGE UP (ref 150–400)
POTASSIUM SERPL-MCNC: 4.2 MMOL/L — SIGNIFICANT CHANGE UP (ref 3.5–5.3)
POTASSIUM SERPL-SCNC: 4.2 MMOL/L — SIGNIFICANT CHANGE UP (ref 3.5–5.3)
PROT SERPL-MCNC: 6.4 G/DL — SIGNIFICANT CHANGE UP (ref 6–8.3)
RBC # BLD: 3.97 M/UL — SIGNIFICANT CHANGE UP (ref 3.8–5.2)
RBC # FLD: 17.2 % — HIGH (ref 10.3–14.5)
SODIUM SERPL-SCNC: 140 MMOL/L — SIGNIFICANT CHANGE UP (ref 135–145)
WBC # BLD: 8.48 K/UL — SIGNIFICANT CHANGE UP (ref 3.8–10.5)
WBC # FLD AUTO: 8.48 K/UL — SIGNIFICANT CHANGE UP (ref 3.8–10.5)

## 2025-01-05 PROCEDURE — 99232 SBSQ HOSP IP/OBS MODERATE 35: CPT

## 2025-01-05 RX ORDER — BENZONATATE 100 MG
200 CAPSULE ORAL THREE TIMES A DAY
Refills: 0 | Status: DISCONTINUED | OUTPATIENT
Start: 2025-01-05 | End: 2025-01-06

## 2025-01-05 RX ORDER — GUAIFENESIN 100 MG/5ML
600 SYRUP ORAL EVERY 12 HOURS
Refills: 0 | Status: DISCONTINUED | OUTPATIENT
Start: 2025-01-05 | End: 2025-01-06

## 2025-01-05 RX ADMIN — Medication 4 MILLIGRAM(S): at 00:01

## 2025-01-05 RX ADMIN — Medication 200 MILLIGRAM(S): at 20:46

## 2025-01-05 RX ADMIN — Medication 4 MILLIGRAM(S): at 00:31

## 2025-01-05 RX ADMIN — ENOXAPARIN SODIUM 40 MILLIGRAM(S): 60 INJECTION INTRAVENOUS; SUBCUTANEOUS at 06:28

## 2025-01-05 RX ADMIN — Medication 600 MILLIGRAM(S): at 15:56

## 2025-01-05 RX ADMIN — Medication 200 MILLIGRAM(S): at 12:27

## 2025-01-05 NOTE — PROGRESS NOTE ADULT - ASSESSMENT
45F with morbid obesity, psoriasis, varicose veins in her left leg, kidney stones, possibly HTN/HLD (not on meds) who presented for chest pain.      Chest pain - her d-dimer was elevated but unfortunately her CTA was non-diagnostic.  Patient is therefore being admitted for a VQ scan and possibly TTE.  EKG normal  - already received lovenox 140mg subq empirically  - will continue lovenox for now   - monitor for bleeding  - spoke to cardiology (Dr. Marquez) regarding current plan -> VQ pending   - VQ ordered negative for PE  - pain control as needed    vaginal bleed  has mesnutral period  likely worsening bleeding for full dose ac  will stop  monitor hh     Elevated d-dimer - multiple possible causes such as PE.  DVT also needs to be ruled out as well since she is morbidly obese and has varicose veins in her left leg.  Elevated d-dimer can also be caused by her "inflammation" that her OB/GYN was concerned about and suggested surgery (malignancy?  patient does not know what the surgery is for).    - will be anti-coaguated with lovenox  - will check US dopplers for DVTs  - try to obtain collateral information from OB/GYN    Back pain/Kidney stones - had bilateral punctate nonobstructive renal calculi in 2020, Cr normal currently  - will obtain renal US to evaluate (cannot get CT 2/2 recent contrast dye load)  - order UA     49-year-old female with a history of lung CA, HLD, HTN, cervical CA, kidney stones, morbid obesity presents with back pain, chest pain, cough.     Admitted for RSV with hx of active malignancy  gmf  monitor for respiratory distress  f/u CT chest  CT chest shows metastatic disease which family is aware, pt currently on chemo.   continue mucinex and tessalon    DVT proph  lovenox

## 2025-01-05 NOTE — PROGRESS NOTE ADULT - SUBJECTIVE AND OBJECTIVE BOX
Patient is a 49y old  Female who presents with a chief complaint of     INTERVAL HPI/OVERNIGHT EVENTS:    no overnight events    patient continues to cough, feeling unwell    updated daughter over phone demond    MEDICATIONS  (STANDING):  benzonatate 200 milliGRAM(s) Oral three times a day  enoxaparin Injectable 40 milliGRAM(s) SubCutaneous every 12 hours  guaiFENesin  milliGRAM(s) Oral every 12 hours    MEDICATIONS  (PRN):  acetaminophen     Tablet .. 650 milliGRAM(s) Oral every 6 hours PRN Temp greater or equal to 38C (100.4F), Mild Pain (1 - 3)  aluminum hydroxide/magnesium hydroxide/simethicone Suspension 30 milliLiter(s) Oral every 4 hours PRN Dyspepsia  melatonin 3 milliGRAM(s) Oral at bedtime PRN Insomnia  ondansetron Injectable 4 milliGRAM(s) IV Push every 8 hours PRN Nausea and/or Vomiting      Allergies    anesthesia (Other)  penicillin (Other)  propofol (Unknown)    Intolerances        REVIEW OF SYSTEMS:  as above    Vital Signs Last 24 Hrs  T(C): 36.3 (05 Jan 2025 10:42), Max: 36.8 (05 Jan 2025 05:53)  T(F): 97.4 (05 Jan 2025 10:42), Max: 98.3 (05 Jan 2025 05:53)  HR: 88 (05 Jan 2025 10:42) (71 - 88)  BP: 94/64 (05 Jan 2025 10:42) (94/64 - 107/71)  BP(mean): --  RR: 18 (05 Jan 2025 10:42) (18 - 20)  SpO2: 94% (05 Jan 2025 10:42) (93% - 94%)    Parameters below as of 05 Jan 2025 10:42  Patient On (Oxygen Delivery Method): room air        PHYSICAL EXAM:   GENERAL: NAD, well-groomed, well-developed  HEAD:  Atraumatic, Normocephalic  EYES: EOMI, PERRLA, conjunctiva and sclera clear  ENMT: No tonsillar erythema, exudates, or enlargement; Moist mucous membranes  NECK: Supple, No JVD, Normal thyroid  CHEST/LUNG: Clear to percussion bilaterally; No rales, rhonchi, wheezing, or rubs  HEART: Regular rate and rhythm; No murmurs, rubs, or gallops  ABDOMEN: Soft, Nontender, Nondistended; Bowel sounds present  EXTREMITIES:  2+ Peripheral Pulses, No clubbing, cyanosis, or edema  NERVOUS SYSTEM:  Alert & Oriented X3, Good concentration; Motor Strength 5/5 B/L upper and lower extremities; DTRs 2+ intact and symmetric  SKIN: No rashes or lesions    LABS:                        11.5   8.48  )-----------( 243      ( 05 Jan 2025 06:00 )             36.0     05 Jan 2025 06:00    140    |  105    |  10     ----------------------------<  126    4.2     |  29     |  0.56     Ca    8.6        05 Jan 2025 06:00    TPro  6.4    /  Alb  3.3    /  TBili  0.3    /  DBili  x      /  AST  19     /  ALT  56     /  AlkPhos  92     05 Jan 2025 06:00      Urinalysis Basic - ( 05 Jan 2025 06:00 )    Color: x / Appearance: x / SG: x / pH: x  Gluc: 126 mg/dL / Ketone: x  / Bili: x / Urobili: x   Blood: x / Protein: x / Nitrite: x   Leuk Esterase: x / RBC: x / WBC x   Sq Epi: x / Non Sq Epi: x / Bacteria: x      CAPILLARY BLOOD GLUCOSE          RADIOLOGY & ADDITIONAL TESTS:

## 2025-01-05 NOTE — CONSULT NOTE ADULT - ASSESSMENT
49-year-old female with a history of lung CA, HLD, HTN, cervical CA, kidney stones, morbid obesity presents with back pain, chest pain, cough.      RSV  leukocytosis- resolved  CT chest- Many new bilateral pulmonary nodules are compatible with metastatic disease. No evidence of pneumonia.    Recommendations  continue off antibiotics  supportive care for RSV- anti-tussive, mucinex  no ID objection to discharge    Rickey Lopes M.D.  [facility]  [phone]  After 5pm on weekdays and all day on weekends - please call [phone]  Available on [employer] teams

## 2025-01-05 NOTE — CONSULT NOTE ADULT - SUBJECTIVE AND OBJECTIVE BOX
[facility]  JENNY Gibbs S. Shah, Y. Patel, G. Moses  [phone]  ([phone] - weekdays after 5pm and weekends)    JAY JAY SUNSHINE  49y, Female  [id number]    HPI--  HPI:  49-year-old female with a history of lung CA, HLD, HTN, cervical CA, kidney stones, morbid obesity presents with back pain, chest pain, cough.  Patient states that she developed a productive cough 1 week ago.  She states when she coughs she has midsternal chest pain that radiates to her back.  She also reports subjective fevers at home for which she has been taking Tylenol.  Patient is on chemotherapy for lung CA currently on her fourth cycle and it was last given approximately 10 days ago.  Patient also reports family members at home who have been sick with upper respiratory infections.       (2025 11:09)    ROS: 14 point review of systems completed, pertinent positives and negatives as per HPI.    Allergies: anesthesia (Other)  penicillin (Other)  propofol (Unknown)    PMH -- Kidney stones    HTN (hypertension)    HLD (hyperlipidemia)    Psoriasis    Morbid obesity    H/O varicose veins    Cervical cancer    Gastritis      PSH -- H/O tubal ligation    H/O:     History of cholecystectomy    H/O: hysterectomy      FH -- FH: diabetes mellitus (Mother)    FH: heart disease (Aunt)      Social History -- denies tobacco use    Physical Exam--  Vital Signs Last 24 Hrs  T(F): 98.6 (2025 15:00), Max: 98.6 (2025 15:00)  HR: 83 (2025 15:00) (71 - 88)  BP: 103/70 (2025 15:00) (94/64 - 107/71)  RR: 17 (2025 15:00) (17 - 20)  SpO2: 96% (2025 15:00) (93% - 96%)  General: nontoxic-appearing, no acute distress  HEENT: anicteric  Lungs: mild wheezes  Heart: S1, S2, normal rate.   Abdomen: Soft. Nondistended. Nontender.   Neuro: no obvious focal deficits   Extremities: No LE edema.   Skin: Warm. Dry. No rash.  Psychiatric: Appropriate affect and mood for situation.     Laboratory & Imaging Data--  CBC:                       11.5   8.48  )-----------( 243      ( 2025 06:00 )             36.0     WBC Count: 8.48 K/uL (25 @ 06:00)  WBC Count: 12.61 K/uL (25 @ 05:15)    CMP:     140  |  105  |  10  ----------------------------<  126[H]  4.2   |  29  |  0.56    Ca    8.6      2025 06:00    TPro  6.4  /  Alb  3.3  /  TBili  0.3  /  DBili  x   /  AST  19  /  ALT  56  /  AlkPhos  92      LIVER FUNCTIONS - ( 2025 06:00 )  Alb: 3.3 g/dL / Pro: 6.4 g/dL / ALK PHOS: 92 U/L / ALT: 56 U/L / AST: 19 U/L / GGT: x           Urinalysis Basic - ( 2025 06:00 )    Color: x / Appearance: x / SG: x / pH: x  Gluc: 126 mg/dL / Ketone: x  / Bili: x / Urobili: x   Blood: x / Protein: x / Nitrite: x   Leuk Esterase: x / RBC: x / WBC x   Sq Epi: x / Non Sq Epi: x / Bacteria: x      Microbiology:       Radiology--  ***  Active Medications--  acetaminophen     Tablet .. 650 milliGRAM(s) Oral every 6 hours PRN  aluminum hydroxide/magnesium hydroxide/simethicone Suspension 30 milliLiter(s) Oral every 4 hours PRN  benzonatate 200 milliGRAM(s) Oral three times a day  enoxaparin Injectable 40 milliGRAM(s) SubCutaneous every 12 hours  guaiFENesin  milliGRAM(s) Oral every 12 hours  melatonin 3 milliGRAM(s) Oral at bedtime PRN  ondansetron Injectable 4 milliGRAM(s) IV Push every 8 hours PRN    Antimicrobials:     Immunologic:

## 2025-01-06 ENCOUNTER — TRANSCRIPTION ENCOUNTER (OUTPATIENT)
Age: 50
End: 2025-01-06

## 2025-01-06 VITALS
SYSTOLIC BLOOD PRESSURE: 118 MMHG | RESPIRATION RATE: 18 BRPM | OXYGEN SATURATION: 93 % | DIASTOLIC BLOOD PRESSURE: 81 MMHG | HEART RATE: 81 BPM | TEMPERATURE: 98 F

## 2025-01-06 PROCEDURE — 0241U: CPT

## 2025-01-06 PROCEDURE — 85025 COMPLETE CBC W/AUTO DIFF WBC: CPT

## 2025-01-06 PROCEDURE — 71045 X-RAY EXAM CHEST 1 VIEW: CPT

## 2025-01-06 PROCEDURE — 93005 ELECTROCARDIOGRAM TRACING: CPT

## 2025-01-06 PROCEDURE — 99285 EMERGENCY DEPT VISIT HI MDM: CPT

## 2025-01-06 PROCEDURE — 71250 CT THORAX DX C-: CPT | Mod: MC

## 2025-01-06 PROCEDURE — 99239 HOSP IP/OBS DSCHRG MGMT >30: CPT

## 2025-01-06 PROCEDURE — 96375 TX/PRO/DX INJ NEW DRUG ADDON: CPT

## 2025-01-06 PROCEDURE — 36415 COLL VENOUS BLD VENIPUNCTURE: CPT

## 2025-01-06 PROCEDURE — 85027 COMPLETE CBC AUTOMATED: CPT

## 2025-01-06 PROCEDURE — 87637 SARSCOV2&INF A&B&RSV AMP PRB: CPT

## 2025-01-06 PROCEDURE — 80053 COMPREHEN METABOLIC PANEL: CPT

## 2025-01-06 PROCEDURE — 96374 THER/PROPH/DIAG INJ IV PUSH: CPT

## 2025-01-06 RX ORDER — GUAIFENESIN 100 MG/5ML
1 SYRUP ORAL
Qty: 14 | Refills: 0
Start: 2025-01-06 | End: 2025-01-12

## 2025-01-06 RX ORDER — BENZONATATE 100 MG
2 CAPSULE ORAL
Qty: 42 | Refills: 0
Start: 2025-01-06 | End: 2025-01-12

## 2025-01-06 RX ADMIN — ENOXAPARIN SODIUM 40 MILLIGRAM(S): 60 INJECTION INTRAVENOUS; SUBCUTANEOUS at 05:15

## 2025-01-06 RX ADMIN — Medication 200 MILLIGRAM(S): at 05:15

## 2025-01-06 RX ADMIN — Medication 600 MILLIGRAM(S): at 05:15

## 2025-01-06 RX ADMIN — Medication 200 MILLIGRAM(S): at 14:06

## 2025-01-06 NOTE — PROGRESS NOTE ADULT - ASSESSMENT
49-year-old female with a history of lung CA, HLD, HTN, cervical CA, kidney stones, morbid obesity presents with back pain, chest pain, cough.      RSV  leukocytosis- resolved  CT chest- Many new bilateral pulmonary nodules are compatible with metastatic disease. No evidence of pneumonia.    Recommendations  continue off antibiotics  supportive care, anti-tussive  additional management per primary team    Stable from ID standpoint  D/c planning per primary team when medically ready    Infectious Diseases will follow. Please call with any questions.  Isabelle Gonzalez M.D.  Available on [employer] TEAMS -- *[facility]*  [facility] [phone]  For after 5 P.M. and weekends, please call [phone]

## 2025-01-06 NOTE — DISCHARGE NOTE PROVIDER - HOSPITAL COURSE
HPI:  49-year-old female with a history of lung CA, HLD, HTN, cervical CA, kidney stones, morbid obesity presents with back pain, chest pain, cough.  Patient states that she developed a productive cough 1 week ago.  She states when she coughs she has midsternal chest pain that radiates to her back.  She also reports subjective fevers at home for which she has been taking Tylenol.  Patient is on chemotherapy for lung CA currently on her fourth cycle and it was last given approximately 10 days ago.  Patient also reports family members at home who have been sick with upper respiratory infections.  PMD Rashid Luu                          11.0   12.61 )-----------( 195      ( 04 Jan 2025 05:15 )             35.2   01-04    139  |  104  |  11  ----------------------------<  154[H]  4.0   |  25  |  0.69    Ca    8.9      04 Jan 2025 05:15    TPro  6.9  /  Alb  3.5  /  TBili  0.2  /  DBili  x   /  AST  30  /  ALT  67[H]  /  AlkPhos  97  01-04     (04 Jan 2025 11:09)      49-year-old female with a history of lung CA, HLD, HTN, cervical CA, kidney stones, morbid obesity presents with back pain, chest pain, cough.     Admitted for RSV with hx of active malignancy  gmf  monitor for respiratory distress  f/u CT chest  CT chest shows metastatic disease which family is aware, pt currently on chemo.   pt feels better today, will be discharged home.      DVT proph  lovenox

## 2025-01-06 NOTE — DISCHARGE NOTE PROVIDER - NSDCMRMEDTOKEN_GEN_ALL_CORE_FT
Hycodan 5 mg-1.5 mg/5 mL oral syrup: 5 milliliter(s) orally every 6 hours as needed for  cough MDD: 20ml  pantoprazole 40 mg oral delayed release tablet: 1 tab(s) orally once a day  Zithromax 250 mg oral tablet: 1 tab(s) orally once a day   benzonatate 100 mg oral capsule: 2 cap(s) orally 3 times a day  guaiFENesin 600 mg oral tablet, extended release: 1 tab(s) orally every 12 hours

## 2025-01-06 NOTE — CAREGIVER ENGAGEMENT NOTE - CAREGIVER EDUCATION - TYPES DISCUSSED
Discharge plan/DME/Home Care Services/Insurance benefits/Post-acute care agency contact/Post-discharge escalation process/Transportation coordination

## 2025-01-06 NOTE — CARE COORDINATION ASSESSMENT. - NSCAREPROVIDERS_GEN_ALL_CORE_FT
CARE PROVIDERS:  Accepting Physician: Nestor Pyle  Administration: Roxanne Mancilla  Administration: Fatemeh Manning  Administration: Joel Vivar  Administration: Yee Nolan  Admitting: Nestor Pyle  Attending: Nestor Pyle  Case Management: Aissatou Mtz  Consultant: Rickey Lopes  Consultant: Isabelle Gonzalez  ED Attending: Glo De Guzman  ED Nurse: Debi Lynch  ED Nurse 2: Karley Garcia  Infection Control: Kaylie Bal  Nurse: Nieves Bowers  Outpatient Provider: Yadira Marquez  Override: Nieves Bowers  PCA/Nursing Assistant: Fred Kirkpatrick  Primary Team: Nestor Pyle  Primary Team: Adilson Bonilla  Registered Dietitian: Milly Lombardi  Respiratory Therapy: Willi Holt  : Airam Suárez  Team: SY  Hospitalists, Team  UR// Supp. Assoc.: Samina Peterson

## 2025-01-06 NOTE — DISCHARGE NOTE PROVIDER - NSDCCPCAREPLAN_GEN_ALL_CORE_FT
PRINCIPAL DISCHARGE DIAGNOSIS  Diagnosis: Acute URI due to respiratory syncytial virus (RSV)  Assessment and Plan of Treatment: supportive care, continue cough medications as needed, follow up with pcp

## 2025-01-06 NOTE — DISCHARGE NOTE NURSING/CASE MANAGEMENT/SOCIAL WORK - NSSCNAMETXT_GEN_ALL_CORE
[facility] [phone] [facility] [phone]. Home care agency will reach out to you within 24-72 hours of your discharge to schedule home care visit/eval appointment with you. Please call agency for any queries regarding home care services

## 2025-01-06 NOTE — PROGRESS NOTE ADULT - SUBJECTIVE AND OBJECTIVE BOX
[facility]  JENNY Gibbs Y. Patel, S. Shah, G. [facility]  [phone]    Name: JAY JAY SUNSHINE  Age: 49y  Gender: Female  MRN: [id number]    Interval History:  Patient seen and examined at bedside  No acute overnight events. Afebrile  Feeling much better  No complaints  Notes reviewed    Antibiotics:      Medications:  acetaminophen     Tablet .. 650 milliGRAM(s) Oral every 6 hours PRN  aluminum hydroxide/magnesium hydroxide/simethicone Suspension 30 milliLiter(s) Oral every 4 hours PRN  benzonatate 200 milliGRAM(s) Oral three times a day  enoxaparin Injectable 40 milliGRAM(s) SubCutaneous every 12 hours  guaiFENesin  milliGRAM(s) Oral every 12 hours  melatonin 3 milliGRAM(s) Oral at bedtime PRN  ondansetron Injectable 4 milliGRAM(s) IV Push every 8 hours PRN      Review of Systems:  A 10-point review of systems was obtained.   Review of systems otherwise negative except as previously noted.    Allergies: anesthesia (Other)  penicillin (Other)  propofol (Unknown)    For details regarding the patient's past medical history, social history, family history, and other miscellaneous elements, please refer the initial infectious diseases consultation and/or the admitting history and physical examination for this admission.    Objective:  Vitals:   T(C): 36.8 (01-06-25 @ 05:02), Max: 37 (01-05-25 @ 15:00)  HR: 78 (01-06-25 @ 05:02) (76 - 88)  BP: 118/72 (01-06-25 @ 05:02) (94/64 - 118/72)  RR: 18 (01-06-25 @ 05:02) (17 - 18)  SpO2: 95% (01-06-25 @ 05:02) (94% - 96%)    Physical Examination:  General: no acute distress  HEENT: NC/AT, EOMI  Cardio: S1, S2 heard, RRR, no murmurs  Resp: breath sounds heard bilaterally  Abd: soft, NT, ND  Ext: no edema or cyanosis  Skin: warm, dry, no visible rash      Laboratory Studies:  CBC:                       11.5   8.48  )-----------( 243      ( 05 Jan 2025 06:00 )             36.0     CMP: 01-05    140  |  105  |  10  ----------------------------<  126[H]  4.2   |  29  |  0.56    Ca    8.6      05 Jan 2025 06:00    TPro  6.4  /  Alb  3.3  /  TBili  0.3  /  DBili  x   /  AST  19  /  ALT  56  /  AlkPhos  92  01-05    LIVER FUNCTIONS - ( 05 Jan 2025 06:00 )  Alb: 3.3 g/dL / Pro: 6.4 g/dL / ALK PHOS: 92 U/L / ALT: 56 U/L / AST: 19 U/L / GGT: x           Urinalysis Basic - ( 05 Jan 2025 06:00 )    Color: x / Appearance: x / SG: x / pH: x  Gluc: 126 mg/dL / Ketone: x  / Bili: x / Urobili: x   Blood: x / Protein: x / Nitrite: x   Leuk Esterase: x / RBC: x / WBC x   Sq Epi: x / Non Sq Epi: x / Bacteria: x        Microbiology: reviewed        Radiology: reviewed

## 2025-01-06 NOTE — DISCHARGE NOTE PROVIDER - ATTENDING DISCHARGE PHYSICAL EXAMINATION:
Vital Signs Last 24 Hrs  T(C): 36.4 (06 Jan 2025 13:16), Max: 37 (05 Jan 2025 15:00)  T(F): 97.5 (06 Jan 2025 13:16), Max: 98.6 (05 Jan 2025 15:00)  HR: 81 (06 Jan 2025 13:16) (76 - 83)  BP: 118/81 (06 Jan 2025 13:16) (103/70 - 118/81)  BP(mean): --  RR: 18 (06 Jan 2025 13:16) (17 - 18)  SpO2: 93% (06 Jan 2025 13:16) (93% - 96%)    Parameters below as of 06 Jan 2025 05:02  Patient On (Oxygen Delivery Method): room air    GENERAL: NAD, well-groomed, well-developed  HEAD:  Atraumatic, Normocephalic  EYES: EOMI, PERRLA, conjunctiva and sclera clear  ENMT: No tonsillar erythema, exudates, or enlargement; Moist mucous membranes  NECK: Supple, No JVD, Normal thyroid  CHEST/LUNG: Clear to percussion bilaterally; No rales, rhonchi, wheezing, or rubs  HEART: Regular rate and rhythm; No murmurs, rubs, or gallops  ABDOMEN: Soft, Nontender, Nondistended; Bowel sounds present  EXTREMITIES:  2+ Peripheral Pulses, No clubbing, cyanosis, or edema  NERVOUS SYSTEM:  Alert & Oriented X3, Good concentration; Motor Strength 5/5 B/L upper and lower extremities; DTRs 2+ intact and symmetric  SKIN: No rashes or lesions

## 2025-01-06 NOTE — CARE COORDINATION ASSESSMENT. - NSPASTMEDSURGHISTORY_GEN_ALL_CORE_FT
PAST MEDICAL & SURGICAL HISTORY:  Kidney stones      H/O tubal ligation      H/O varicose veins      Morbid obesity      Psoriasis      HLD (hyperlipidemia)      HTN (hypertension)      History of cholecystectomy      H/O:       Gastritis      Cervical cancer      H/O: hysterectomy

## 2025-01-06 NOTE — CAREGIVER ENGAGEMENT NOTE - CAREGIVER EDUCATION NOTES - FREE TEXT
Mrs. Alina Castillo is a 50y/o , domiciled  female, w/ PMHx as indicated below, who presents to [facility] secondary to RSV.  Aissatou and this  met w/ patient @ bedside on unit 1East for completion of care coordination assessment, @ which time patient appeared alert & oriented x4, as well as calm, pleasant, and fully able to engage in discussion. Of note, patient's primary language is Nepali, so /CM offered to utilize audiovisual  services, but patient declined and opted to call her daughter Natasha Reyes @ cell [phone] for assistance w/ interpreting.    Patient resides in a private home w/ her  Romie and five children where there are zero steps to enter and 3 steps inside that she must navigate on discharge. She is typically able to attend to her activities of daily living (ADLs) independently w/o the use of any durable medical equipment or home health aide services, and she has access to her appointments and prescription medications in the community via being driven by one of her family members as needed. Her daughter confirmed that, prior to hospitalization, patient was receiving home care visiting nurse services through [facility] @ p [phone] and has an upcoming appt on Wednesday, 01/08/2025, @ 12:40PM w/ her oncologist, Dr. Sal Conroy DO, @ the [facility] @ [address], [address], [place] [address], phone [phone]. Her primary care physician is Dr. Rashid Castillo MD, @ [address] [place] [address], phone [phone]. Her pharmacy in the community is [facility] @ [address] [place] [id number], phone [phone].    Of note, Social Work Consult received in [place] electronic health record for life challenges related to cancer diagnosis. When  met w/ patient to address such, she identified that she has a lot of support from her  and five children.  offered to provide resources for psychotherapy, to which patient appeared ambivalent, so  provided list of outpatient mental health counseling services in [facility] should patient decide she is interested.    Additionally, SW/CM provided 24hr verbal notice for discharge and reviewed Discharge Notice (for Non-Medicare Recipients) regarding patient/ family's right to appeal hospital discharge, to which patient identified that she was agreeable to discharge today, 01/06/2025.

## 2025-01-06 NOTE — CASE MANAGEMENT PROGRESS NOTE - NSCMPROGRESSNOTE_GEN_ALL_CORE
RN/CM met with pt, explained role of CM. Provided pt with CM contact info. Discussed about possible home care services/expectations, insurance provisions, choices of agencies. Pt stated that [facility] [phone], home care RN was Mitch; pt is opting to resume services with same agency. CM sent referral to above home care agency, notified of plan for DC home today 01/06/2025, with request for start of care 01/07/2025. Pt stated she resides with supportive family, independent prior to admission and her spouse, Romie will transport her home. Pt confirmed- community MD is DR Rashid Lui [phone]; pharmacy is [facility] on [address]- [place]. Pt has follow-up appt with oncologist Sal Baldwin on WED 01/08/2025 @ 12:40 PM. Pt is aware of plan for possible DC later today, DC notice has been provided. Staff on unit apprised re: all above. CM remains available.

## 2025-01-06 NOTE — DISCHARGE NOTE NURSING/CASE MANAGEMENT/SOCIAL WORK - FINANCIAL ASSISTANCE
[facility] provides services at a reduced cost to those who are determined to be eligible through [facility]’s financial assistance program. Information regarding [facility]’s financial assistance program can be found by going to [email or web address] or by calling [phone].

## 2025-01-06 NOTE — CARE COORDINATION ASSESSMENT. - NSADDITIONAL INFORMATION_FT
Mrs. Alina Castillo is a 48y/o , domiciled  female, w/ PMHx as indicated below, who presents to [facility] secondary to RSV.  Aissatou and this  met w/ patient @ bedside on unit 1East for completion of care coordination assessment, @ which time patient appeared alert & oriented x4, as well as calm, pleasant, and fully able to engage in discussion. Of note, patient's primary language is Belarusian, so /CM offered to utilize audiovisual  services, but patient declined and opted to call her daughter Natasha Reyes @ cell [phone] for assistance w/ interpreting.    Patient resides in a private home w/ her  Romie and five children where there are zero steps to enter and 3 steps inside that she must navigate on discharge. She is typically able to attend to her activities of daily living (ADLs) independently w/o the use of any durable medical equipment or home health aide services, and she has access to her appointments and prescription medications in the community via being driven by one of her family members as needed. Her daughter confirmed that, prior to hospitalization, patient was receiving home care visiting nurse services through [facility] @ p [phone] and has an upcoming appt on Wednesday, 01/08/2025, @ 12:40PM w/ her oncologist, Dr. Sal Conroy DO, @ the [facility] @ [address], [address], [place] [address], phone [phone]. Her primary care physician is Dr. Rashid Castillo MD, @ [address] [place] [address], phone [phone]. [facility] [address] [place] [address], phone [phone].    Of note, Social Work Consult received in [place] electronic health record for life challenges related to cancer diagnosis. When  met w/ patient to address such, she identified that she has a lot of support from her  and five children.  offered to provide resources for psychotherapy, to which patient appeared ambivalent, so  provided list of outpatient mental health counseling services in [facility] should patient decide she is interested.    Additionally, SW/CM provided 24hr verbal notice for discharge and reviewed Discharge Notice (for Non-Medicare Recipients) regarding patient/ family's right to appeal hospital discharge, to which patient identified that she was agreeable to discharge today, 01/06/2025.

## 2025-01-06 NOTE — DISCHARGE NOTE NURSING/CASE MANAGEMENT/SOCIAL WORK - PATIENT PORTAL LINK FT
You can access the FollowMyHealth Patient Portal offered by [facility] by registering at the following website: [email or web address]. By joining [employer]’s FollowMyHealth portal, you will also be able to view your health information using other applications (apps) compatible with our system.

## 2025-01-06 NOTE — DISCHARGE NOTE PROVIDER - CARE PROVIDER_API CALL
Rashid Castillo Sammy  Internal Medicine  [address] [place] [address]  Phone: [phone]  Fax: [phone]  Follow Up Time:

## 2025-07-21 NOTE — ED ADULT NURSE NOTE - PAIN: PRESENCE, MLM
